# Patient Record
Sex: FEMALE | Race: WHITE | NOT HISPANIC OR LATINO | Employment: UNEMPLOYED | URBAN - NONMETROPOLITAN AREA
[De-identification: names, ages, dates, MRNs, and addresses within clinical notes are randomized per-mention and may not be internally consistent; named-entity substitution may affect disease eponyms.]

---

## 2018-02-20 ENCOUNTER — OFFICE VISIT CONVERTED (OUTPATIENT)
Dept: FAMILY MEDICINE CLINIC | Age: 58
End: 2018-02-20
Attending: FAMILY MEDICINE

## 2018-09-19 ENCOUNTER — CONVERSION ENCOUNTER (OUTPATIENT)
Dept: OTOLARYNGOLOGY | Facility: CLINIC | Age: 58
End: 2018-09-19

## 2018-09-19 ENCOUNTER — OFFICE VISIT CONVERTED (OUTPATIENT)
Dept: OTOLARYNGOLOGY | Facility: CLINIC | Age: 58
End: 2018-09-19
Attending: OTOLARYNGOLOGY

## 2021-04-28 ENCOUNTER — TELEPHONE (OUTPATIENT)
Dept: NEUROSURGERY | Facility: CLINIC | Age: 61
End: 2021-04-28

## 2021-05-16 VITALS — TEMPERATURE: 98.4 F | HEIGHT: 66 IN | WEIGHT: 211.37 LBS | BODY MASS INDEX: 33.97 KG/M2

## 2021-05-18 NOTE — PROGRESS NOTES
Laura Villarreal 1960     Office/Outpatient Visit    Visit Date: Tue, Feb 20, 2018 02:24 pm    Provider: Jerome Matute MD (Assistant: Anca Plummer MA)    Location: [facility]        Electronically signed by Jerome Matute MD on  02/21/2018 09:02:10 AM                             SUBJECTIVE:        CC: oral lesion         HPI:     Sister is in today for evaluation of what may be pemphigoid.  She recently saw Dr. Lopez locally for evaluation of this and was told that she might have pemphigoid.  Sister does not have skin rash or lesions.  However, she was told that her sores were not canker sores.  Rather, the dentist noted these blisters.  She is here for additional evaluation of this.  She denies other acute issue today.     ROS:     CONSTITUTIONAL:  Negative for chills and fever.      CARDIOVASCULAR:  Negative for chest pain and palpitations.      RESPIRATORY:  Negative for recent cough and dyspnea.      GASTROINTESTINAL:  Negative for abdominal pain, nausea and vomiting.          [facility]/:     Last Reviewed on 2/20/2018 02:38 PM by Jerome Matute    Past Medical History:             PAST MEDICAL HISTORY         Positive for    Asthma: Multiple Allergies; ;     Positive for    anxiety/depression;             PAST MEDICAL HISTORY             PREVENTIVE HEALTH MAINTENANCE             COLORECTAL CANCER SCREENING: Up to date (colonoscopy q10y; sigmoidoscopy q5y; Cologuard q3y) was last done 12/2016, Results are in chart; colonoscopy with the following abnormalities noted-- tubulovillous adenoma     MAMMOGRAM: Done within last 2 years and results in are chart was last done 12/19/17 with normal results         Surgical History:         Tonsillectomy; 1965      Hysterectomy: 2005;     right knee  1993;    laser endrometrial 1993;    sinus and vocal cord surg 1998;    vocal cord gat grafting and collagen implntation 1998; Other Surgeries    Right ankle cyst removal 2003;     "\"Rectal Cyst\" removed ;     COLONOSCOPY: was last done  with the following abnormalaties noted-- polyp Talbert         Family History:     Father:  at age 79;  Myocardial Infarction;  Alzheimer's Disease;  Type 2 Diabetes     Mother: Hypothyroidism     Brother(s): Healthy; 3 brother(s) total     Sister(s): 1 sister(s) total;  Skin Cancer     Paternal Grandfather: Alzheimer's Disease         Social History:     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     Screening for breast cancer, unspecified         Immunizations:     Prevnar-13 2016     Fluzone pf-quadrivalent 3 and up 10/17/2016     Fluzone (3 + years dose) 2017     Pneomovax 2006         Allergies:     Last Reviewed on 2018 02:38 PM by Jerome Matute    Penicillins:    Sulfonamides:    Niacin (Nicotinic Acid):    Tetanus Toxoids:        Current Medications:     Last Reviewed on 2018 02:38 PM by Jerome Matute    Pramipexole 0.125mg Tablet Take 1 tablet(s) by mouth qhs     Clonazepam 0.5mg Tablet Take 1 tablet(s) by mouth bid as needed for anxiety     Zoloft 100mg Tablet Take 2 tablet(s) by mouth daily     EpiPen " Auto-Injector 2-Edwin 0.3mg 1:1,000 Solution For Injection use as needed for anaphylaxis     Folic Acid 400mg po qd     Vitamin D 400mg po qd     Diphenhydramine HCl 25mg Capsules 2 capsules prn     Montelukast Sodium 10mg Tablet 1 tab daily     Omeprazole 40mg Capsules, Extended Release 1 capsule daily     OCuSoft Lid Scrub Use one pre-moistened pad daily, use more if needed     OTC Alway Eye Drops Use bid     OTC Refresh Plus Eye drops  Use four times daily         OBJECTIVE:        Vitals:         Current: 2/20/2018 2:27:07 PM    Ht:  5 ft, 6.5 in;  Wt: 204 lbs;  BMI: 32.4    T: 98.2 F (oral);  BP: 133/74 mm Hg (left arm, sitting);  P: 97 bpm (left arm (BP Cuff), sitting);  sCr: 0.74 mg/dL;  GFR: 96.38        Exams:     PHYSICAL EXAM:     GENERAL: vital signs recorded - well developed, well nourished;  no apparent distress;     E/N/T:  normal EACs, TMs, nasal/oral mucosa, teeth, gingiva, and oropharynx;     NECK: range of motion is normal; thyroid is non-palpable;     RESPIRATORY: normal respiratory rate and pattern with no distress; normal breath sounds with no rales, rhonchi, wheezes or rubs;     CARDIOVASCULAR: normal rate; rhythm is regular;  no systolic murmur; no edema;     GASTROINTESTINAL: nontender; normal bowel sounds; no organomegaly;     BREAST/INTEGUMENT: SKIN: no significant rashes or lesions; no suspicious moles;         ASSESSMENT           694.60   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  She has no rash and no sign of joint problems.  I have not previously ordered testing to evaluate for pemphigoid.  For this reason, I'm going to refer her to rheumatology for additional evaluation.  No other near term changes are made..            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

## 2021-06-14 ENCOUNTER — TELEPHONE (OUTPATIENT)
Dept: NEUROSURGERY | Facility: CLINIC | Age: 61
End: 2021-06-14

## 2021-07-01 VITALS
SYSTOLIC BLOOD PRESSURE: 133 MMHG | DIASTOLIC BLOOD PRESSURE: 74 MMHG | BODY MASS INDEX: 32.02 KG/M2 | HEIGHT: 67 IN | TEMPERATURE: 98.2 F | HEART RATE: 97 BPM | WEIGHT: 204 LBS

## 2021-07-15 RX ORDER — MOMETASONE FUROATE AND FORMOTEROL FUMARATE DIHYDRATE 200; 5 UG/1; UG/1
AEROSOL RESPIRATORY (INHALATION)
COMMUNITY

## 2021-07-15 RX ORDER — CLONAZEPAM 0.5 MG/1
TABLET ORAL
COMMUNITY

## 2021-07-15 RX ORDER — ALBUTEROL SULFATE 2.5 MG/3ML
SOLUTION RESPIRATORY (INHALATION)
COMMUNITY

## 2021-07-15 RX ORDER — VALACYCLOVIR HYDROCHLORIDE 500 MG/1
500 TABLET, FILM COATED ORAL 2 TIMES DAILY
COMMUNITY
Start: 2021-06-11

## 2021-07-15 RX ORDER — DULOXETIN HYDROCHLORIDE 20 MG/1
60 CAPSULE, DELAYED RELEASE ORAL DAILY
COMMUNITY

## 2021-07-15 RX ORDER — ASPIRIN 81 MG/1
TABLET ORAL
COMMUNITY

## 2021-07-15 RX ORDER — EPINEPHRINE 0.3 MG/.3ML
INJECTION SUBCUTANEOUS
COMMUNITY

## 2021-07-15 RX ORDER — OMEPRAZOLE 40 MG/1
CAPSULE, DELAYED RELEASE ORAL
COMMUNITY

## 2021-07-15 RX ORDER — TRAMADOL HYDROCHLORIDE 50 MG/1
TABLET ORAL
COMMUNITY

## 2021-07-15 RX ORDER — UREA 10 %
LOTION (ML) TOPICAL
COMMUNITY

## 2021-07-15 RX ORDER — DIPHENHYDRAMINE HCL 25 MG
CAPSULE ORAL
COMMUNITY

## 2021-07-15 RX ORDER — MONTELUKAST SODIUM 10 MG/1
TABLET ORAL
COMMUNITY

## 2021-07-15 RX ORDER — LAMOTRIGINE 25 MG/1
TABLET ORAL
COMMUNITY

## 2021-07-15 RX ORDER — LIDOCAINE 50 MG/G
OINTMENT TOPICAL
COMMUNITY
Start: 2021-06-11

## 2021-07-15 RX ORDER — INDOMETHACIN 50 MG/1
CAPSULE ORAL
COMMUNITY
Start: 2021-06-03

## 2021-07-15 RX ORDER — FLUTICASONE PROPIONATE 50 MCG
SPRAY, SUSPENSION (ML) NASAL
COMMUNITY

## 2021-07-15 RX ORDER — METHOCARBAMOL 500 MG/1
TABLET, FILM COATED ORAL
COMMUNITY

## 2021-07-15 RX ORDER — ASCORBIC ACID 500 MG
TABLET ORAL
COMMUNITY

## 2021-07-20 RX ORDER — EZETIMIBE 10 MG/1
10 TABLET ORAL DAILY
COMMUNITY

## 2021-07-20 RX ORDER — AMLODIPINE BESYLATE 5 MG/1
5 TABLET ORAL DAILY
COMMUNITY

## 2021-07-20 RX ORDER — CARVEDILOL 12.5 MG/1
12.5 TABLET ORAL 2 TIMES DAILY WITH MEALS
COMMUNITY

## 2021-07-22 PROBLEM — M54.50 LOW BACK PAIN: Status: ACTIVE | Noted: 2021-07-22

## 2021-07-22 PROBLEM — J34.89 SINUS DRAINAGE: Status: ACTIVE | Noted: 2021-07-22

## 2021-07-22 PROBLEM — J45.909 ASTHMA: Status: ACTIVE | Noted: 2021-07-22

## 2021-07-22 PROBLEM — E54 VITAMIN C DEFICIENCY: Status: ACTIVE | Noted: 2021-07-22

## 2021-07-22 PROBLEM — F41.9 ANXIETY: Status: ACTIVE | Noted: 2021-07-22

## 2021-07-22 PROBLEM — R49.0 HOARSENESS: Status: ACTIVE | Noted: 2021-07-22

## 2021-07-22 PROBLEM — IMO0002 DEGENERATION OF INTERVERTEBRAL DISC: Status: ACTIVE | Noted: 2021-07-22

## 2021-07-22 PROBLEM — R94.31 EKG ABNORMALITIES: Status: ACTIVE | Noted: 2017-07-05

## 2021-07-22 RX ORDER — TIOTROPIUM BROMIDE INHALATION SPRAY 1.56 UG/1
SPRAY, METERED RESPIRATORY (INHALATION)
COMMUNITY
Start: 2021-04-21

## 2021-07-22 RX ORDER — MIRTAZAPINE 15 MG/1
7.5 TABLET, FILM COATED ORAL NIGHTLY
COMMUNITY

## 2021-07-22 RX ORDER — SERTRALINE HYDROCHLORIDE 100 MG/1
TABLET, FILM COATED ORAL
COMMUNITY

## 2021-07-22 RX ORDER — ALBUTEROL SULFATE 90 UG/1
2 AEROSOL, METERED RESPIRATORY (INHALATION) EVERY 4 HOURS PRN
COMMUNITY

## 2021-07-23 ENCOUNTER — OFFICE VISIT (OUTPATIENT)
Dept: GASTROENTEROLOGY | Facility: CLINIC | Age: 61
End: 2021-07-23

## 2021-07-23 VITALS
HEIGHT: 67 IN | SYSTOLIC BLOOD PRESSURE: 126 MMHG | DIASTOLIC BLOOD PRESSURE: 66 MMHG | HEART RATE: 80 BPM | WEIGHT: 221.2 LBS | BODY MASS INDEX: 34.72 KG/M2 | OXYGEN SATURATION: 96 %

## 2021-07-23 DIAGNOSIS — R11.11 VOMITING WITHOUT NAUSEA, INTRACTABILITY OF VOMITING NOT SPECIFIED, UNSPECIFIED VOMITING TYPE: Primary | ICD-10-CM

## 2021-07-23 PROCEDURE — 99204 OFFICE O/P NEW MOD 45 MIN: CPT | Performed by: NURSE PRACTITIONER

## 2021-07-23 RX ORDER — CETIRIZINE HYDROCHLORIDE 10 MG/1
10 TABLET ORAL DAILY
COMMUNITY

## 2021-07-23 RX ORDER — ARIPIPRAZOLE 5 MG/1
5 TABLET ORAL DAILY
COMMUNITY

## 2021-07-23 NOTE — PROGRESS NOTES
Chief Complaint  Vomiting (2 TO 3 TIMES PER MONTH/NO PATTERN/RANDOM EPISODES ) and Establish Care (NEW PATIENT/[facility] PT )    Laura Villarreal is a 61 y.o. female who presents to [facility] as a new patient.     Result Review :   The following data was reviewed by: Emily Escalante NP on 07/23/2021     History of Present Illness     61 year old female presenting to the office as a new patient with a history of vomiting. Patient reports no consistency in the vomiting and why it is occurring. She reports having an EGD performed over a year ago with  with no findings. Reports vomiting 2-3 times a month. No difference in time of day. Denies any nausea with the vomiting comes on quickly and reports feeling fine before and after. She reports taking omeprazole daily 40 mg with no reflux symptoms. Bowel movements are occurring 1-2 times a day, she takes a stool softener daily to aid with passing of the stool but reports no concerns for constipation.  Patient denies fever, nausea, weight loss, night sweats, melena, hematochezia, hematemesis.    Patient reports EGD over 1 year ago with .  Will retrieve records.  [facility] at [facility].     Labs completed at pcp about 3 weeks ago. Will retrieve records.              Past Medical History:   Diagnosis Date   • Anxiety    • Asthma    • EKG abnormalities 07/05/2017   • H/O degenerative disc disease    • Hoarseness    • Lumbago    • Sinus drainage    • Vitamin C deficiency        Past Surgical History:   Procedure Laterality Date   • ADENOIDECTOMY     • HYSTERECTOMY     • KNEE SURGERY     • SINUS SURGERY     • TONSILLECTOMY           Current Outpatient Medications:   •  albuterol (PROVENTIL) (2.5 MG/3ML) 0.083% nebulizer solution, , Disp: , Rfl:   •  albuterol sulfate  (90 Base) MCG/ACT inhaler, Inhale 2 puffs Every 4 (Four) Hours As Needed., Disp: , Rfl:   •  amLODIPine (NORVASC) 5 MG tablet, Take 5 mg by  mouth Daily., Disp: , Rfl:   •  ARIPiprazole (ABILIFY) 5 MG tablet, Take 5 mg by mouth Daily., Disp: , Rfl:   •  ascorbic acid (VITAMIN C) 500 MG tablet, Vitamin C 500 mg oral tablet take 1 tablet by oral route daily   Active, Disp: , Rfl:   •  carvedilol (COREG) 12.5 MG tablet, Take 12.5 mg by mouth 2 (Two) Times a Day With Meals., Disp: , Rfl:   •  cetirizine (zyrTEC) 10 MG tablet, Take 10 mg by mouth Daily., Disp: , Rfl:   •  diphenhydrAMINE (Benadryl Allergy) 25 mg capsule, Benadryl 25 mg oral capsule take 2 capsules (50 mg) by oral route once daily at bedtime as needed   Active, Disp: , Rfl:   •  docusate sodium (COLACE) 50 MG capsule, Take  by mouth 2 (Two) Times a Day As Needed for Constipation., Disp: , Rfl:   •  DULoxetine (CYMBALTA) 20 MG capsule, Take 60 mg by mouth Daily., Disp: , Rfl:   •  EPINEPHrine (EpiPen 2-Edwin) 0.3 MG/0.3ML solution auto-injector injection, , Disp: , Rfl:   •  ezetimibe (ZETIA) 10 MG tablet, Take 10 mg by mouth Daily., Disp: , Rfl:   •  fluticasone (FLONASE) 50 MCG/ACT nasal spray, fluticasone 50 mcg/actuation nasal spray,suspension spray 2 sprays (100 mcg) in each nostril by intranasal route once daily as needed   Active, Disp: , Rfl:   •  folic acid (FOLVITE) 800 MCG tablet, folic acid 800 mcg oral tablet take 1 tablet (0.8 mg) by oral route once daily   Active, Disp: , Rfl:   •  guaiFENesin (Mucinex) 600 MG 12 hr tablet, Mucinex 600 mg oral tablet extended release 12hr take 2 tablets (1,200 mg) by oral route every 12 hours as needed   Active, Disp: , Rfl:   •  omeprazole (priLOSEC) 40 MG capsule, , Disp: , Rfl:   •  Spiriva Respimat 1.25 MCG/ACT aerosol solution inhaler, , Disp: , Rfl:   •  Umeclidinium Bromide (Incruse Ellipta) 62.5 MCG/INH aerosol powder , Incruse Ellipta 62.5 mcg/actuation inhalation blister with device inhale 1 puff (62.5 mcg) by inhalation route once daily at the same time each day   Active, Disp: , Rfl:   •  aspirin (aspirin) 81 MG EC tablet, aspirin 81 mg  oral tablet,delayed release (DR/EC) take 1 tablet (81 mg) by oral route once daily   Active, Disp: , Rfl:   •  Cholecalciferol 50 MCG (2000 UT) capsule, , Disp: , Rfl:   •  clonazePAM (KlonoPIN) 0.5 MG tablet, , Disp: , Rfl:   •  Cyanocobalamin 1000 MCG/ML liquid, , Disp: , Rfl:   •  indomethacin (INDOCIN) 50 MG capsule, TAKE 1 CAPSULE BY MOUTH 2-3 TIMES DAILY, Disp: , Rfl:   •  lamoTRIgine (LaMICtal) 25 MG tablet, lamotrigine 25 mg oral tablet take 1 tablet by oral route 2 times a day   Active, Disp: , Rfl:   •  lidocaine (XYLOCAINE) 5 % ointment, APPLY TO AFFECTED AREA UP TO FOUR TIMES DAILY AS DIRECTED, Disp: , Rfl:   •  methocarbamol (ROBAXIN) 500 MG tablet, , Disp: , Rfl:   •  mirtazapine (REMERON) 15 MG tablet, Take 7.5 mg by mouth Every Night., Disp: , Rfl:   •  mometasone-formoterol (Dulera) 200-5 MCG/ACT inhaler, Dulera 200-5 mcg/actuation inhalation HFA aerosol inhaler inhale 2 puffs by inhalation route every 12 hours   Active, Disp: , Rfl:   •  montelukast (SINGULAIR) 10 MG tablet, montelukast 10 mg oral tablet take 1 tablet (10 mg) by oral route once daily in the evening   Active, Disp: , Rfl:   •  sertraline (Zoloft) 100 MG tablet, , Disp: , Rfl:   •  traMADol (ULTRAM) 50 MG tablet, tramadol 50 mg oral tablet take 1 tablet (50 mg) by oral route every 6 hours as needed   Active, Disp: , Rfl:   •  valACYclovir (VALTREX) 500 MG tablet, Take 500 mg by mouth 2 (Two) Times a Day., Disp: , Rfl:      Allergies   Allergen Reactions   • Fish-Derived Products Anaphylaxis     PT REPORTS SHE IS HIGHLY ALLERGIC TO ALL SHELLFISH, SEAFOOD INCLUDING TUNA, AND SHRIMP    • Nuts Anaphylaxis     PT CARRIES AN EPI PEN FOR BOTH SEAFOOD AND NUT ALLERGIES    • Niacin Hives   • Penicillins Hives   • Sulfa Antibiotics Hives   • Tetanus Toxoids Hives   • Keflex [Cephalexin] Unknown - Low Severity   • Latex Hives   • Prednisone Hives and Rash       Family History   Problem Relation Age of Onset   • Heart disease Father    • Other  "Father         SPINE PROBLEMS   • Diabetes Father    • Cancer Maternal Grandfather    • Canavan disease Paternal Grandmother    • Heart disease Other         UNCLE        Social History     Social History Narrative   • Not on file       Objective     Vital Signs:   /66 (BP Location: Left arm, Patient Position: Sitting, Cuff Size: Adult)   Pulse 80   Ht 168.9 cm (66.5\")   Wt 100 kg (221 lb 3.2 oz)   SpO2 96%   BMI 35.17 kg/m²     Body mass index is 35.17 kg/m².    Physical Exam  Constitutional:       General: She is not in acute distress.     Appearance: Normal appearance. She is well-developed. She is obese.   Eyes:      Conjunctiva/sclera: Conjunctivae normal.      Pupils: Pupils are equal, round, and reactive to light.      Visual Fields: Right eye visual fields normal and left eye visual fields normal.   Cardiovascular:      Rate and Rhythm: Normal rate and regular rhythm.      Heart sounds: Normal heart sounds.   Pulmonary:      Effort: Pulmonary effort is normal. No retractions.      Breath sounds: Normal breath sounds and air entry.      Comments: Inspection of chest: normal appearance  Abdominal:      General: Bowel sounds are normal.      Palpations: Abdomen is soft.      Tenderness: There is no abdominal tenderness.      Comments: No appreciable hepatosplenomegaly   Musculoskeletal:      Cervical back: Neck supple.      Right lower leg: No edema.      Left lower leg: No edema.   Lymphadenopathy:      Cervical: No cervical adenopathy.   Skin:     Findings: No lesion.      Comments: Turgor normal   Neurological:      Mental Status: She is alert and oriented to person, place, and time.   Psychiatric:         Mood and Affect: Mood and affect normal.                 Assessment and Plan    Diagnoses and all orders for this visit:    1. Vomiting without nausea, intractability of vomiting not specified, unspecified vomiting type (Primary)  -     NM Gastric Emptying; Future    61-year-old female " presenting to the office today as a new patient with a history of vomiting without nausea.  Patient has had an EGD in the past year with Dr. Pacheco I will retrieve records.  Patient had lab work performed with primary care provider I will retrieve records.  I have set the patient up for a gastric emptying study.  We did discuss the need for a repeat EGD.  Patient would like to start with a gastric emptying study and then consider EGD.  I have educated the patient to eat 5 small meals throughout the day.  Patient will keep a food diary to ensure that this is not related to a particular food.  We will follow up in the office in 3 months.  Patient to call the office with any questions or concerns.            Follow Up   Return in about 3 months (around 10/23/2021).  Patient was given instructions and counseling regarding her condition or for health maintenance advice. Please see specific information pulled into the AVS if appropriate.

## 2021-07-23 NOTE — PATIENT INSTRUCTIONS
Vomiting, Adult  Vomiting occurs when stomach contents are thrown up and out of the mouth. Many people notice nausea before vomiting. Vomiting can make you feel weak and cause you to become dehydrated. Dehydration can make you feel tired and thirsty, cause you to have a dry mouth, and decrease how often you urinate. Older adults and people who have other diseases or a weak body defense system (immune system) are at higher risk for dehydration. It is important to treat vomiting as told by your health care provider.  Follow these instructions at home:    Eating and drinking         Follow these recommendations as told by your health care provider:  · Take an oral rehydration solution (ORS). This is a drink that is sold at pharmacies and retail stores.  · Eat bland, easy-to-digest foods in small amounts as you are able. These foods include bananas, applesauce, rice, lean meats, toast, and crackers.  · Drink clear fluids slowly and in small amounts as you are able. Clear fluids include water, ice chips, low-calorie sports drinks, and fruit juice that has water added (diluted fruit juice).  · Avoid drinking fluids that contain a lot of sugar or caffeine, such as energy drinks, sports drinks, and soda.  · Avoid alcohol.  · Avoid spicy or fatty foods.    General instructions  · Wash your hands often using soap and water. If soap and water are not available, use hand . Make sure that everyone in your household washes their hands frequently.  · Take over-the-counter and prescription medicines only as told by your health care provider.  · Rest at home while you recover.  · Watch your condition for any changes.  · Keep all follow-up visits as told by your health care provider. This is important.  Contact a health care provider if:  · Your vomiting gets worse.  · You have new symptoms.  · You have a fever.  · You cannot drink fluids without vomiting.  · You feel light-headed or dizzy.  · You have a headache.  · You  have muscle cramps.  · You have a rash.  · You have pain while urinating.  Get help right away if:  · You have pain in your chest, neck, arm, or jaw.  · You feel extremely weak or you faint.  · You have persistent vomiting.  · You have vomit that is bright red or looks like black coffee grounds.  · You have stools that are bloody or black, or stools that look like tar.  · You have a severe headache, a stiff neck, or both.  · You have severe pain, cramping, or bloating in your abdomen.  · You have trouble breathing or you are breathing very quickly.  · Your heart is beating very quickly.  · Your skin feels cold and clammy.  · You feel confused.  · You have signs of dehydration, such as:  ? Dark urine, very little urine, or no urine.  ? Cracked lips.  ? Dry mouth.  ? Sunken eyes.  ? Sleepiness.  ? Weakness.  These symptoms may represent a serious problem that is an emergency. Do not wait to see if the symptoms will go away. Get medical help right away. Call your local emergency services (911 in the [place] Do not drive yourself to the hospital.  Summary  · Vomiting occurs when stomach contents are thrown up and out of the mouth. Vomiting can cause you to become dehydrated. Older adults and people who have other diseases or a weak immune system are at higher risk for dehydration.  · It is important to treat vomiting as told by your health care provider. Follow your health care provider's instructions about eating and drinking.  · Wash your hands often using soap and water. If soap and water are not available, use hand . Make sure that everyone in your household washes their hands frequently.  · Watch your condition for any changes and for signs of dehydration.  · Keep all follow-up visits as told by your health care provider. This is important.  This information is not intended to replace advice given to you by your health care provider. Make sure you discuss any questions you have with your health care  provider.  Document Revised: 06/05/2020 Document Reviewed: 05/28/2019  [employer] Patient Education © 2021 [employer]

## 2021-07-29 DIAGNOSIS — R11.11 VOMITING WITHOUT NAUSEA, INTRACTABILITY OF VOMITING NOT SPECIFIED, UNSPECIFIED VOMITING TYPE: Primary | ICD-10-CM

## 2021-08-11 NOTE — PROGRESS NOTES
Subjective   Patient ID: Laura Villarreal is a 61 y.o. female is being seen for consultation today at the request of Mellisa Dow MD for lumbar radiculopathy.  Notes from today's visit will be forwarded to Dr. Dow upon completion. MRI lumbar done on 3/31/21.    History of Present Illness     Sr. Sanchez is being seen today for neurosurgical opinion regarding low back and L lateral hip, anterolateral thigh, shin and top of foot pain. The back pain started back in March. By June/early July she was having severe pain in the L leg as described above. The pain was described as severe cramping pain that progressed to a sensation of hot lava being poured over the L leg. She was prescribed a medrol dose pack and has noticed resolution in the cramping pain. She still has the burning symptoms in the left leg but it occurs intermittently at different levels of severity. She denies any bowel or bladder incontinence. She uses a cane to mobilize but notes no severe leg weakness.      The following portions of the patient's history were reviewed and updated as appropriate: allergies, current medications, past family history, past medical history, past social history, past surgical history and problem list.    Review of Systems   Constitutional: Negative for chills and fever.   HENT: Negative for ear pain and tinnitus.    Eyes: Negative for pain and visual disturbance.   Respiratory: Negative for cough and shortness of breath.    Cardiovascular: Negative for chest pain and palpitations.   Gastrointestinal: Negative for abdominal pain and nausea.        No b/b incontinence   Genitourinary: Negative for difficulty urinating and enuresis.   Musculoskeletal: Positive for back pain (radiates to L leg) and gait problem.   Skin: Negative for rash.   Neurological: Positive for numbness ( L Leg tinglng/burning). Negative for weakness.   Psychiatric/Behavioral: Positive for sleep disturbance (L leg).       Objective      Physical  Exam  Vitals reviewed.   Constitutional:       General: She is not in acute distress.     Appearance: Normal appearance. She is well-developed. She is obese. She is not ill-appearing or diaphoretic.   HENT:      Head: Normocephalic and atraumatic.   Eyes:      General:         Right eye: No discharge.         Left eye: No discharge.      Conjunctiva/sclera: Conjunctivae normal.   Neck:      Trachea: No tracheal deviation.   Cardiovascular:      Rate and Rhythm: Normal rate.      Comments: NO calf tenderness, warmth or discoloration to bilateral palpation.   Pulmonary:      Effort: Pulmonary effort is normal. No respiratory distress.   Abdominal:      General: There is no distension.      Palpations: Abdomen is soft.      Tenderness: There is no abdominal tenderness.   Musculoskeletal:         General: No tenderness. Normal range of motion.      Cervical back: Normal range of motion and neck supple.      Right lower leg: Edema (mild) present.      Left lower leg: Edema (mild ) present.   Skin:     General: Skin is warm and dry.      Findings: No erythema.   Neurological:      Mental Status: She is alert and oriented to person, place, and time.      GCS: GCS eye subscore is 4. GCS verbal subscore is 5. GCS motor subscore is 6.      Sensory: No sensory deficit.      Motor: No abnormal muscle tone.      Coordination: Coordination normal.      Deep Tendon Reflexes: Reflexes are normal and symmetric. Reflexes normal.      Comments: No motor or sensory deficits. DTR's normal. Negative Ramirez's; negative clonus. SLR positive on the left at 30 degrees. Able to bear weight on heels and toes bilaterally.     Psychiatric:         Behavior: Behavior is cooperative.         Thought Content: Thought content normal.        Assessment/Plan   Independent Review of Radiographic Studies:      I have independently viewed MRI images of lumbar spine from [facility] from March of this year. The MRI shows multi-level  degenerative changes on both sides of the lumbar spine. Foraminal narrowing noted on the left at L3/4 and bilaterally at L4/5.    Medical Decision Making:      Sr. Sanchez notes that she is doing better in terms of her overall pain but would just like to have more relief in the left leg pain. There is no weakness on exam so I think that conservative management can be tried. Sr. Sanchez will try a lumbar MARVIN and call the office if her symptoms do not improve or she develops leg weakness or B/B incontinence.     Diagnoses and all orders for this visit:    1. Spinal stenosis, lumbar region, without neurogenic claudication (Primary)  -     Epidural Block    2. Left lumbar radiculitis  -     Epidural Block      Return if symptoms worsen or fail to improve.

## 2021-08-23 ENCOUNTER — TELEPHONE (OUTPATIENT)
Dept: NEUROSURGERY | Facility: CLINIC | Age: 61
End: 2021-08-23

## 2021-08-23 NOTE — TELEPHONE ENCOUNTER
Caller: Laura Villarreal    Relationship: Self    Best call back number:[phone]    What is the best time to reach you:ANYTIME    Who are you requesting to speak with (clinical staff, provider,  specific staff member): CLINICAL STAFF    Do you know the name of the person who called: NA    What was the call regarding:PT CALLED AND STATES THAT SHE HAD MISSED A CALL FROM OUR OFFICE STATING SHE NEEDED TO CALL BACK-DID NOT SEE AN ENCOUNTER IN THE PTS CHART-PLEASE ADVISE THANK YOU    Do you require a callback:YES

## 2021-08-26 ENCOUNTER — OFFICE VISIT (OUTPATIENT)
Dept: NEUROSURGERY | Facility: CLINIC | Age: 61
End: 2021-08-26

## 2021-08-26 VITALS
BODY MASS INDEX: 35.03 KG/M2 | TEMPERATURE: 97.3 F | SYSTOLIC BLOOD PRESSURE: 142 MMHG | RESPIRATION RATE: 20 BRPM | DIASTOLIC BLOOD PRESSURE: 80 MMHG | HEART RATE: 84 BPM | HEIGHT: 66 IN | WEIGHT: 218 LBS

## 2021-08-26 DIAGNOSIS — M54.16 LEFT LUMBAR RADICULITIS: ICD-10-CM

## 2021-08-26 DIAGNOSIS — M48.061 SPINAL STENOSIS, LUMBAR REGION, WITHOUT NEUROGENIC CLAUDICATION: Primary | ICD-10-CM

## 2021-08-26 PROCEDURE — 99213 OFFICE O/P EST LOW 20 MIN: CPT | Performed by: NURSE PRACTITIONER

## 2021-08-26 RX ORDER — FLUTICASONE FUROATE 200 UG/1
POWDER RESPIRATORY (INHALATION)
COMMUNITY
Start: 2021-06-24

## 2021-08-27 ENCOUNTER — TELEPHONE (OUTPATIENT)
Dept: NEUROSURGERY | Facility: CLINIC | Age: 61
End: 2021-08-27

## 2021-08-27 NOTE — TELEPHONE ENCOUNTER
I called and left message on [facility] office voicemail to call the office. (would like to refer a patient -- do they have a referral form & what is the fax number).

## 2021-09-15 ENCOUNTER — TELEPHONE (OUTPATIENT)
Dept: GASTROENTEROLOGY | Facility: CLINIC | Age: 61
End: 2021-09-15

## 2021-09-15 NOTE — TELEPHONE ENCOUNTER
"I spoke with patient in regards to esophagram and gastric emptying study. Patient wants to defer at this time due to having a HIDA scan at the end of the month. Patient states she can \"only handle one thing at a time,\" and she \"wasn't aware of all these tests being ordered.\" Patient deferred testing at this time.   "

## 2024-03-13 ENCOUNTER — TELEMEDICINE (OUTPATIENT)
Dept: FAMILY MEDICINE CLINIC | Age: 64
End: 2024-03-13
Payer: COMMERCIAL

## 2024-03-13 DIAGNOSIS — L20.84 INTRINSIC ECZEMA: ICD-10-CM

## 2024-03-13 DIAGNOSIS — J30.1 NON-SEASONAL ALLERGIC RHINITIS DUE TO POLLEN: ICD-10-CM

## 2024-03-13 DIAGNOSIS — J01.00 SUBACUTE MAXILLARY SINUSITIS: Primary | ICD-10-CM

## 2024-03-13 DIAGNOSIS — J45.21 MILD INTERMITTENT ASTHMA WITH EXACERBATION: ICD-10-CM

## 2024-03-13 DIAGNOSIS — J40 BRONCHITIS: ICD-10-CM

## 2024-03-13 PROCEDURE — 99203 OFFICE O/P NEW LOW 30 MIN: CPT | Performed by: FAMILY MEDICINE

## 2024-03-13 RX ORDER — AZITHROMYCIN 250 MG/1
TABLET, FILM COATED ORAL
Qty: 6 TABLET | Refills: 0 | Status: SHIPPED | OUTPATIENT
Start: 2024-03-13

## 2024-03-13 RX ORDER — BENZOCAINE/MENTHOL 6 MG-10 MG
1 LOZENGE MUCOUS MEMBRANE 2 TIMES DAILY
Qty: 14 G | Refills: 0 | Status: SHIPPED | OUTPATIENT
Start: 2024-03-13

## 2024-03-13 RX ORDER — DEXTROMETHORPHAN HYDROBROMIDE AND PROMETHAZINE HYDROCHLORIDE 15; 6.25 MG/5ML; MG/5ML
5 SYRUP ORAL 4 TIMES DAILY PRN
Qty: 180 ML | Refills: 0 | Status: SHIPPED | OUTPATIENT
Start: 2024-03-13

## 2024-03-13 RX ORDER — FLUTICASONE PROPIONATE 50 MCG
2 SPRAY, SUSPENSION (ML) NASAL DAILY
Qty: 15.8 ML | Refills: 2 | Status: SHIPPED | OUTPATIENT
Start: 2024-03-13

## 2024-03-13 NOTE — PROGRESS NOTES
Laura Villarreal presents to [facility]. Patient is being seen, via telehealth, and pt is not in the office-she is at [place].  Mychart or doximetry was used for this visit. Laura and I were able to hear each other simultaneously in real time. I introduced myself and verified Laura identity. I explained how the telemedicine visit will occur.  Laura verbally gave consent for the use of telemedicine in her care. Laura is also aware that this visit will be charged as a regular OV which may require a copay      Chief Complaint: SOA, sinus congestion    Subjective     History of Present Illness:  HPI  Laura has been sick for over a month, she has fatigue, SOA, sinus congestion.  She is on anuity and spiriva for her asthma.  She is on allergy shots she is also currently on cetirizine 10 mg daily.  she has productive green sputum cough for past 2 weeks, it is getting worse, cough is spasmic.  No fever/N/V/diarrhea    Her GERD is worse since she has been sick.  She has chronic GERD and is on omeprazole 40 mg daily    She has underlying history of hypertension is currently on amlodipine, carvedilol    Her depression is well-controlled with Abilify 5 mg daily and Cymbalta 60 mg daily she presents with hypercholesterolemia.  Current treatment is low-cholesterol diet and rosuvastatin 10 mg nightly.  She tolerates medication well                Result Review   The following data was reviewed by Corinne Randolph MD on 03/13/2024.             Assessment and Plan:   Diagnoses and all orders for this visit:    1. Subacute maxillary sinusitis (Primary)  Comments:  Will treat with azithromycin, Flonase NS and I will call in Promethazine DM for her bronchitis.  She is to follow-up if no improvement or worsening sxs.  Orders:  -     fluticasone (FLONASE) 50 MCG/ACT nasal spray; 2 sprays into the nostril(s) as directed by provider Daily.  Dispense: 15.8 mL; Refill: 2  -      promethazine-dextromethorphan (PROMETHAZINE-DM) 6.25-15 MG/5ML syrup; Take 5 mL by mouth 4 (Four) Times a Day As Needed for Cough.  Dispense: 180 mL; Refill: 0  -     azithromycin (ZITHROMAX) 250 MG tablet; Take 2 tablets PO the first day, then 1 tablet PO daily for 4 days.  Dispense: 6 tablet; Refill: 0    2. Intrinsic eczema  Comments:  In her ears bilaterally.  Will start her on triamcinolone topical steroid cream to treat twice daily x 10 days  Orders:  -     hydrocortisone 1 % cream; Apply 1 Application topically to the appropriate area as directed 2 (Two) Times a Day.  Dispense: 14 g; Refill: 0    3. Bronchitis  Comments:  Will treat with azithromycin and Promethazine DM.  She can continue her inhalers annuity and Dulera as well as as needed albuterol  Orders:  -     fluticasone (FLONASE) 50 MCG/ACT nasal spray; 2 sprays into the nostril(s) as directed by provider Daily.  Dispense: 15.8 mL; Refill: 2  -     promethazine-dextromethorphan (PROMETHAZINE-DM) 6.25-15 MG/5ML syrup; Take 5 mL by mouth 4 (Four) Times a Day As Needed for Cough.  Dispense: 180 mL; Refill: 0  -     azithromycin (ZITHROMAX) 250 MG tablet; Take 2 tablets PO the first day, then 1 tablet PO daily for 4 days.  Dispense: 6 tablet; Refill: 0    4. Mild intermittent asthma with exacerbation  Comments:  Will treat with azithromycin and Promethazine DM. She can continue her inhalers annuity and Dulera as well as as needed albuterol    5. Non-seasonal allergic rhinitis due to pollen  Comments:  Continue Zyrtec and start Flonase nasal spray              Objective     Medications:  Current Outpatient Medications   Medication Instructions    albuterol (PROVENTIL) (2.5 MG/3ML) 0.083% nebulizer solution No dose, route, or frequency recorded.    albuterol sulfate  (90 Base) MCG/ACT inhaler 2 puffs, Inhalation, Every 4 Hours PRN    amLODIPine (NORVASC) 5 mg, Oral, Daily    ARIPiprazole (ABILIFY) 5 mg, Oral, Daily    Arnuity Ellipta 200 MCG/ACT  aerosol powder  No dose, route, or frequency recorded.    ascorbic acid (VITAMIN C) 500 MG tablet Vitamin C 500 mg oral tablet take 1 tablet by oral route daily   Active    aspirin (aspirin) 81 MG EC tablet aspirin 81 mg oral tablet,delayed release (DR/EC) take 1 tablet (81 mg) by oral route once daily   Active    azithromycin (ZITHROMAX) 250 MG tablet Take 2 tablets PO the first day, then 1 tablet PO daily for 4 days.    carvedilol (COREG) 12.5 mg, Oral, 2 Times Daily With Meals    cetirizine (ZYRTEC) 10 mg, Oral, Daily    Cholecalciferol 50 MCG (2000 UT) capsule No dose, route, or frequency recorded.    clonazePAM (KlonoPIN) 0.5 MG tablet No dose, route, or frequency recorded.    Cyanocobalamin 1000 MCG/ML liquid No dose, route, or frequency recorded.    diphenhydrAMINE (Benadryl Allergy) 25 mg capsule Benadryl 25 mg oral capsule take 2 capsules (50 mg) by oral route once daily at bedtime as needed   Active    docusate sodium (COLACE) 50 MG capsule Oral, 2 Times Daily PRN    DULoxetine (CYMBALTA) 60 mg, Oral, Daily    EPINEPHrine (EpiPen 2-Edwin) 0.3 MG/0.3ML solution auto-injector injection No dose, route, or frequency recorded.    ezetimibe (ZETIA) 10 mg, Oral, Daily    fluticasone (FLONASE) 50 MCG/ACT nasal spray 2 sprays, Nasal, Daily    folic acid (FOLVITE) 800 MCG tablet folic acid 800 mcg oral tablet take 1 tablet (0.8 mg) by oral route once daily   Active    guaiFENesin (Mucinex) 600 MG 12 hr tablet Mucinex 600 mg oral tablet extended release 12hr take 2 tablets (1,200 mg) by oral route every 12 hours as needed   Active    hydrocortisone 1 % cream 1 Application, Topical, 2 Times Daily    indomethacin (INDOCIN) 50 MG capsule TAKE 1 CAPSULE BY MOUTH 2-3 TIMES DAILY    lamoTRIgine (LaMICtal) 25 MG tablet lamotrigine 25 mg oral tablet take 1 tablet by oral route 2 times a day   Active    lidocaine (XYLOCAINE) 5 % ointment APPLY TO AFFECTED AREA UP TO FOUR TIMES DAILY AS DIRECTED    methocarbamol (ROBAXIN) 500 MG  tablet No dose, route, or frequency recorded.    mirtazapine (REMERON) 7.5 mg, Oral, Nightly    mometasone-formoterol (Dulera) 200-5 MCG/ACT inhaler Dulera 200-5 mcg/actuation inhalation HFA aerosol inhaler inhale 2 puffs by inhalation route every 12 hours   Active    montelukast (SINGULAIR) 10 MG tablet montelukast 10 mg oral tablet take 1 tablet (10 mg) by oral route once daily in the evening   Active    omeprazole (priLOSEC) 40 MG capsule No dose, route, or frequency recorded.    promethazine-dextromethorphan (PROMETHAZINE-DM) 6.25-15 MG/5ML syrup 5 mL, Oral, 4 Times Daily PRN    sertraline (Zoloft) 100 MG tablet No dose, route, or frequency recorded.    Spiriva Respimat 1.25 MCG/ACT aerosol solution inhaler No dose, route, or frequency recorded.    traMADol (ULTRAM) 50 MG tablet tramadol 50 mg oral tablet take 1 tablet (50 mg) by oral route every 6 hours as needed   Active    Umeclidinium Bromide (Incruse Ellipta) 62.5 MCG/INH aerosol powder  Incruse Ellipta 62.5 mcg/actuation inhalation blister with device inhale 1 puff (62.5 mcg) by inhalation route once daily at the same time each day   Active    valACYclovir (VALTREX) 500 mg, Oral, 2 Times Daily        Vital Signs:   There were no vitals taken for this visit.    BMI cannot be calculated due to outdated height or weight values.  Please input a current height/weight in Vitals and re-renter BMIFOLLOWUP in Note to pull in correct documentation based on BMI range.       Physical Exam:  Physical Exam  Vitals and nursing note reviewed.   Constitutional:       General: She is not in acute distress.     Appearance: Normal appearance. She is not ill-appearing, toxic-appearing or diaphoretic.   HENT:      Head: Normocephalic and atraumatic.      Right Ear: Tympanic membrane, ear canal and external ear normal.      Left Ear: Tympanic membrane, ear canal and external ear normal.      Nose: No congestion or rhinorrhea.      Mouth/Throat:      Mouth: Mucous membranes are  moist.      Pharynx: Oropharynx is clear. No oropharyngeal exudate or posterior oropharyngeal erythema.   Eyes:      Extraocular Movements: Extraocular movements intact.      Conjunctiva/sclera: Conjunctivae normal.      Pupils: Pupils are equal, round, and reactive to light.   Cardiovascular:      Rate and Rhythm: Normal rate and regular rhythm.      Heart sounds: Normal heart sounds.   Pulmonary:      Effort: Pulmonary effort is normal.      Breath sounds: Normal breath sounds. No wheezing, rhonchi or rales.   Abdominal:      General: Abdomen is flat.      Palpations: Abdomen is soft. There is no mass.      Tenderness: There is no abdominal tenderness.      Hernia: No hernia is present.   Musculoskeletal:      Cervical back: Neck supple. No rigidity.      Right lower leg: No edema.      Left lower leg: No edema.   Lymphadenopathy:      Cervical: No cervical adenopathy.   Skin:     General: Skin is warm and dry.      Comments: Erythema and eczematous reaction outer ear bilaterally   Neurological:      General: No focal deficit present.      Mental Status: She is alert and oriented to person, place, and time. Mental status is at baseline.   Psychiatric:         Mood and Affect: Mood normal.         Behavior: Behavior normal.         Thought Content: Thought content normal.         Judgment: Judgment normal.           Review of Systems:  Review of Systems   Constitutional:  Positive for fatigue. Negative for chills and fever.   HENT:  Positive for congestion, ear pain, rhinorrhea and sinus pressure. Negative for sore throat.    Eyes:  Negative for blurred vision and double vision.   Respiratory:  Positive for cough and shortness of breath. Negative for wheezing.    Cardiovascular:  Negative for chest pain and palpitations.   Gastrointestinal:  Negative for abdominal pain, blood in stool, constipation, diarrhea, nausea and vomiting.   Skin:  Positive for rash.   Neurological:  Negative for dizziness and headache.    Psychiatric/Behavioral:  Negative for sleep disturbance, suicidal ideas and depressed mood. The patient is not nervous/anxious.               Follow Up   No follow-ups on file.    Part of this note may be an electronic transcription/translation of spoken language to printed   text using the Dragon Dictation System.            Medical History:  Medications Discontinued During This Encounter   Medication Reason    fluticasone (FLONASE) 50 MCG/ACT nasal spray Reorder      Past Medical History:    Anxiety    Asthma    EKG abnormalities    H/O degenerative disc disease    Hoarseness    Lumbago    Sinus drainage    Vitamin C deficiency     Past Surgical History:    ADENOIDECTOMY    HYSTERECTOMY    KNEE SURGERY    SINUS SURGERY    TONSILLECTOMY      Family History   Problem Relation Age of Onset    Heart disease Father     Other Father         SPINE PROBLEMS    Diabetes Father     Cancer Maternal Grandfather     Canavan disease Paternal Grandmother     Heart disease Other         UNCLE     Social History     Tobacco Use    Smoking status: Never    Smokeless tobacco: Never   Substance Use Topics    Alcohol use: Never       Health Maintenance Due   Topic Date Due    COLORECTAL CANCER SCREENING  Never done    Pneumococcal Vaccine 0-64 (1 of 2 - PCV) Never done    RSV Vaccine - Adults (1 - 1-dose 60+ series) Never done    HEPATITIS C SCREENING  Never done    ANNUAL PHYSICAL  Never done        Immunization History   Administered Date(s) Administered    COVID-19 (PFIZER) BIVALENT 12+YRS 10/12/2022    COVID-19 (PFIZER) Purple Cap Monovalent 01/22/2021, 02/12/2021, 10/31/2021, 04/26/2022    COVID-19 F23 (PFIZER) 12YRS+ (COMIRNATY) 10/25/2023       Allergies   Allergen Reactions    Fish-Derived Products Anaphylaxis     PT REPORTS SHE IS HIGHLY ALLERGIC TO ALL SHELLFISH, SEAFOOD INCLUDING TUNA, AND SHRIMP     Nuts Anaphylaxis     PT CARRIES AN EPI PEN FOR BOTH SEAFOOD AND NUT ALLERGIES     Niacin Hives    Penicillins Hives     Sulfa Antibiotics Hives    Tetanus Toxoids Hives    Keflex [Cephalexin] Unknown - Low Severity    Latex Hives    Prednisone Hives and Rash

## 2024-06-10 ENCOUNTER — TELEPHONE (OUTPATIENT)
Dept: FAMILY MEDICINE CLINIC | Age: 64
End: 2024-06-10
Payer: COMMERCIAL

## 2024-06-10 RX ORDER — DULOXETIN HYDROCHLORIDE 20 MG/1
60 CAPSULE, DELAYED RELEASE ORAL DAILY
Qty: 270 CAPSULE | Refills: 1 | Status: CANCELLED | OUTPATIENT
Start: 2024-06-10

## 2024-06-10 RX ORDER — CIMETIDINE 300 MG/1
TABLET, FILM COATED ORAL
COMMUNITY
End: 2024-06-10

## 2024-06-10 RX ORDER — AMLODIPINE BESYLATE 5 MG/1
5 TABLET ORAL DAILY
Qty: 90 TABLET | Refills: 1 | Status: SHIPPED | OUTPATIENT
Start: 2024-06-10

## 2024-06-10 RX ORDER — DIAZEPAM 5 MG/1
5 TABLET ORAL
COMMUNITY
Start: 2024-02-05 | End: 2024-06-10

## 2024-06-10 RX ORDER — TRIAMCINOLONE ACETONIDE 1 MG/G
OINTMENT TOPICAL
COMMUNITY
End: 2024-06-10

## 2024-06-10 RX ORDER — SERTRALINE HYDROCHLORIDE 100 MG/1
100 TABLET, FILM COATED ORAL DAILY
Qty: 90 TABLET | Refills: 1 | Status: CANCELLED | OUTPATIENT
Start: 2024-06-10

## 2024-06-10 RX ORDER — IBUPROFEN 800 MG/1
1 TABLET ORAL 3 TIMES DAILY
COMMUNITY

## 2024-06-10 RX ORDER — ROSUVASTATIN CALCIUM 10 MG/1
10 TABLET, COATED ORAL DAILY
COMMUNITY
Start: 2024-01-31 | End: 2024-06-10 | Stop reason: SDUPTHER

## 2024-06-10 RX ORDER — CARVEDILOL 12.5 MG/1
12.5 TABLET ORAL 2 TIMES DAILY WITH MEALS
Qty: 180 TABLET | Refills: 1 | Status: SHIPPED | OUTPATIENT
Start: 2024-06-10

## 2024-06-10 RX ORDER — ARIPIPRAZOLE 5 MG/1
5 TABLET ORAL DAILY
Qty: 90 TABLET | Refills: 1 | Status: SHIPPED | OUTPATIENT
Start: 2024-06-10

## 2024-06-10 RX ORDER — ROSUVASTATIN CALCIUM 10 MG/1
10 TABLET, COATED ORAL DAILY
Qty: 90 TABLET | Refills: 1 | Status: SHIPPED | OUTPATIENT
Start: 2024-06-10 | End: 2025-06-10

## 2024-06-10 RX ORDER — CETIRIZINE HYDROCHLORIDE 10 MG/1
10 TABLET ORAL DAILY
Qty: 90 TABLET | Refills: 1 | Status: SHIPPED | OUTPATIENT
Start: 2024-06-10

## 2024-06-10 RX ORDER — DULOXETIN HYDROCHLORIDE 60 MG/1
60 CAPSULE, DELAYED RELEASE ORAL DAILY
Qty: 90 CAPSULE | Refills: 1 | Status: SHIPPED | OUTPATIENT
Start: 2024-06-10

## 2024-06-10 RX ORDER — OMEPRAZOLE 40 MG/1
40 CAPSULE, DELAYED RELEASE ORAL DAILY
Qty: 90 CAPSULE | Refills: 1 | Status: SHIPPED | OUTPATIENT
Start: 2024-06-10

## 2024-06-10 RX ORDER — HYDROCODONE BITARTRATE AND ACETAMINOPHEN 5; 325 MG/1; MG/1
TABLET ORAL
COMMUNITY
End: 2024-06-10

## 2024-06-26 ENCOUNTER — NURSING HOME (OUTPATIENT)
Dept: FAMILY MEDICINE CLINIC | Age: 64
End: 2024-06-26
Payer: COMMERCIAL

## 2024-06-26 VITALS
WEIGHT: 229 LBS | BODY MASS INDEX: 36.96 KG/M2 | DIASTOLIC BLOOD PRESSURE: 83 MMHG | RESPIRATION RATE: 18 BRPM | SYSTOLIC BLOOD PRESSURE: 125 MMHG | HEART RATE: 96 BPM | TEMPERATURE: 98.1 F

## 2024-06-26 DIAGNOSIS — J45.20 MILD INTERMITTENT ASTHMA WITHOUT COMPLICATION: ICD-10-CM

## 2024-06-26 DIAGNOSIS — Z12.11 ENCOUNTER FOR SCREENING COLONOSCOPY: ICD-10-CM

## 2024-06-26 DIAGNOSIS — Z11.59 ENCOUNTER FOR SCREENING FOR OTHER VIRAL DISEASES: ICD-10-CM

## 2024-06-26 DIAGNOSIS — T14.8XXA BRUISING: ICD-10-CM

## 2024-06-26 DIAGNOSIS — M17.0 PRIMARY OSTEOARTHRITIS OF BOTH KNEES: ICD-10-CM

## 2024-06-26 DIAGNOSIS — J30.1 NON-SEASONAL ALLERGIC RHINITIS DUE TO POLLEN: ICD-10-CM

## 2024-06-26 DIAGNOSIS — Z23 ENCOUNTER FOR IMMUNIZATION: ICD-10-CM

## 2024-06-26 DIAGNOSIS — L20.84 INTRINSIC ECZEMA: ICD-10-CM

## 2024-06-26 DIAGNOSIS — Z78.0 POSTMENOPAUSAL STATE: ICD-10-CM

## 2024-06-26 DIAGNOSIS — Z12.31 SCREENING MAMMOGRAM FOR BREAST CANCER: ICD-10-CM

## 2024-06-26 DIAGNOSIS — Z00.00 ENCOUNTER FOR ANNUAL WELLNESS EXAM IN MEDICARE PATIENT: Primary | ICD-10-CM

## 2024-06-26 RX ORDER — NYSTATIN AND TRIAMCINOLONE ACETONIDE 100000; 1 [USP'U]/G; MG/G
1 OINTMENT TOPICAL 2 TIMES DAILY
Qty: 30 G | Refills: 0 | Status: SHIPPED | OUTPATIENT
Start: 2024-06-26 | End: 2024-06-27 | Stop reason: SDUPTHER

## 2024-06-26 NOTE — PROGRESS NOTES
The ABCs of the Annual Wellness Visit  Subsequent Medicare Wellness Visit    Subjective    Laura Villarreal is a 63 y.o. female who presents for a Subsequent Medicare Wellness Visit.    The following portions of the patient's history were reviewed and   updated as appropriate: allergies, current medications, past family history, past medical history, past social history, past surgical history, and problem list.    Compared to one year ago, the patient feels her physical   health is the same.    Compared to one year ago, the patient feels her mental   health is better.    Recent Hospitalizations:  She was not admitted to the hospital during the last year.       Advance Care Planning  Advance Directive is not on file.  ACP discussion was held with the patient during this visit. Patient has an advance directive (not in EMR), copy requested.    Does the patient have evidence of cognitive impairment? No    Aspirin is on active medication list. Aspirin use is indicated based on review of current medical condition/s. Pros and cons of this therapy have been discussed today. Benefits of this medication outweigh potential harm.  Patient has been encouraged to continue taking this medication.  .      Patient Active Problem List   Diagnosis    Anxiety    Asthma    Degeneration of intervertebral disc    EKG abnormalities    Hoarseness    Low back pain    Sinus drainage    Vitamin C deficiency    Non-seasonal allergic rhinitis due to pollen       Current Medical Providers:  Patient Care Team:  Corinne Randolph MD as PCP - General (Family Medicine)    No opioid medication identified on active medication list. I have reviewed chart for other potential  high risk medication/s and harmful drug interactions in the elderly.             Objective    Vitals:    06/26/24 1141   BP: 125/83   Pulse: 96   Resp: 18   Temp: 98.1 °F (36.7 °C)   Weight: 104 kg (229 lb)     Estimated body mass index is 36.96 kg/m² as calculated from the  "following:    Height as of 21: 167.6 cm (66\").    Weight as of this encounter: 104 kg (229 lb).    BMI cannot be calculated due to outdated height or weight values.  Please input a current height/weight in Vitals and re-renter BMIFOLLOWUP in Note to pull in correct documentation based on BMI range.                HEALTH RISK ASSESSMENT    Smoking Status:  Social History     Tobacco Use   Smoking Status Never   Smokeless Tobacco Never     Alcohol Consumption:  Social History     Substance and Sexual Activity   Alcohol Use Never     Fall Risk Screen:    STEADI Fall Risk Assessment was completed, and patient is at LOW risk for falls.Assessment completed on:2024    Depression Screenin/26/2024    11:00 AM   PHQ-2/PHQ-9 Depression Screening   Little Interest or Pleasure in Doing Things 0-->not at all   Feeling Down, Depressed or Hopeless 0-->not at all   PHQ-9: Brief Depression Severity Measure Score 0       Health Habits and Functional and Cognitive Screenin/26/2024    11:00 AM   Functional & Cognitive Status   Do you have difficulty preparing food and eating? No   Do you have difficulty bathing yourself, getting dressed or grooming yourself? No   Do you have difficulty using the toilet? No   Do you have difficulty moving around from place to place? No   Do you have trouble with steps or getting out of a bed or a chair? Yes   Current Diet Well Balanced Diet   Dental Exam Up to date   Eye Exam Up to date   Exercise (times per week) 7 times per week   Current Exercises Include Walking   Do you need help using the phone?  No   Are you deaf or do you have serious difficulty hearing?  No   Do you need help to go to places out of walking distance? No   Do you need help shopping? No   Do you need help preparing meals?  No   Do you need help with housework?  No   Do you need help with laundry? No   Do you need help taking your medications? No   Do you need help managing money? No   Do you ever drive " or ride in a car without wearing a seat belt? No   Have you felt unusual stress, anger or loneliness in the last month? No   Who do you live with? Community   If you need help, do you have trouble finding someone available to you? No   Have you been bothered in the last four weeks by sexual problems? No   Do you have difficulty concentrating, remembering or making decisions? No       Age-appropriate Screening Schedule:  Refer to the list below for future screening recommendations based on patient's age, sex and/or medical conditions. Orders for these recommended tests are listed in the plan section. The patient has been provided with a written plan.    Health Maintenance   Topic Date Due    COLORECTAL CANCER SCREENING  Never done    HEPATITIS C SCREENING  Never done    INFLUENZA VACCINE  08/01/2024    ANNUAL PHYSICAL  06/26/2025    MAMMOGRAM  08/15/2025    COVID-19 Vaccine  Completed    RSV Vaccine - Adults  Completed    Pneumococcal Vaccine 0-64  Completed    ZOSTER VACCINE  Completed              Outpatient Medications Prior to Visit   Medication Sig Dispense Refill    albuterol (PROVENTIL) (2.5 MG/3ML) 0.083% nebulizer solution       albuterol sulfate  (90 Base) MCG/ACT inhaler Inhale 2 puffs Every 4 (Four) Hours As Needed.      amLODIPine (NORVASC) 5 MG tablet Take 1 tablet by mouth Daily. 90 tablet 1    ARIPiprazole (ABILIFY) 5 MG tablet Take 1 tablet by mouth Daily. 90 tablet 1    Arnuity Ellipta 200 MCG/ACT aerosol powder        ascorbic acid (VITAMIN C) 500 MG tablet Vitamin C 500 mg oral tablet take 1 tablet by oral route daily   Active      aspirin (aspirin) 81 MG EC tablet aspirin 81 mg oral tablet,delayed release (DR/EC) take 1 tablet (81 mg) by oral route once daily   Active      carvedilol (COREG) 12.5 MG tablet Take 1 tablet by mouth 2 (Two) Times a Day With Meals. 180 tablet 1    cetirizine (zyrTEC) 10 MG tablet Take 1 tablet by mouth Daily. 90 tablet 1    Cholecalciferol 50 MCG (2000 UT)  capsule       Diclofenac Sodium (VOLTAREN) 1 % gel gel Apply  topically to the appropriate area as directed 4 (Four) Times a Day.      diphenhydrAMINE (Benadryl Allergy) 25 mg capsule Benadryl 25 mg oral capsule take 2 capsules (50 mg) by oral route once daily at bedtime as needed   Active      docusate sodium (COLACE) 50 MG capsule Take  by mouth 2 (Two) Times a Day As Needed for Constipation.      DULoxetine (CYMBALTA) 60 MG capsule Take 1 capsule by mouth Daily. 90 capsule 1    EPINEPHrine (EpiPen 2-Edwin) 0.3 MG/0.3ML solution auto-injector injection       fluticasone (FLONASE) 50 MCG/ACT nasal spray 2 sprays into the nostril(s) as directed by provider Daily. 15.8 mL 2    folic acid (FOLVITE) 800 MCG tablet folic acid 800 mcg oral tablet take 1 tablet (0.8 mg) by oral route once daily   Active      guaiFENesin (Mucinex) 600 MG 12 hr tablet Mucinex 600 mg oral tablet extended release 12hr take 2 tablets (1,200 mg) by oral route every 12 hours as needed   Active      ibuprofen (ADVIL,MOTRIN) 800 MG tablet Take 1 tablet by mouth 3 (Three) Times a Day.      lamoTRIgine (LaMICtal) 25 MG tablet lamotrigine 25 mg oral tablet take 1 tablet by oral route 2 times a day   Active      montelukast (SINGULAIR) 10 MG tablet montelukast 10 mg oral tablet take 1 tablet (10 mg) by oral route once daily in the evening   Active      omeprazole (priLOSEC) 40 MG capsule Take 1 capsule by mouth Daily. 90 capsule 1    promethazine-dextromethorphan (PROMETHAZINE-DM) 6.25-15 MG/5ML syrup Take 5 mL by mouth 4 (Four) Times a Day As Needed for Cough. 180 mL 0    rosuvastatin (CRESTOR) 10 MG tablet Take 1 tablet by mouth Daily. 90 tablet 1    Spiriva Respimat 1.25 MCG/ACT aerosol solution inhaler       Umeclidinium Bromide (Incruse Ellipta) 62.5 MCG/INH aerosol powder  Incruse Ellipta 62.5 mcg/actuation inhalation blister with device inhale 1 puff (62.5 mcg) by inhalation route once daily at the same time each day   Active       No  facility-administered medications prior to visit.       CMS Preventative Services Quick Reference  Risk Factors Identified During Encounter  Chronic Pain: OTC analgesics as needed. Proper dosing schedule discussed.   Depression/Dysphoria: Current medication is effective, no change recommended  Glaucoma or Family History of Glaucoma:   [facility] yearly eye exams  Immunizations Discussed/Encouraged:  UTD  Dental Screening Recommended  Vision Screening Recommended  The above risks/problems have been discussed with the patient.  Pertinent information has been shared with the patient in the After Visit Summary.  An After Visit Summary and PPPS were made available to the patient.    Follow Up:   Next Medicare Wellness visit to be scheduled in 1 year.       Additional E&M Note during same encounter follows:  Patient has multiple medical problems which are significant and separately identifiable that require additional work above and beyond the Medicare Wellness Visit.         Laura Villarreal presents to [facility].     Assessment and Plan:   Diagnoses and all orders for this visit:    1. Encounter for annual wellness exam in Medicare patient (Primary)    2. Encounter for screening colonoscopy  Comments:  Due for screening colonoscopy reviewed she would like to see Dr. Pacheco  Orders:  -     Cancel: Ambulatory Referral For Screening Colonoscopy  -     Ambulatory Referral For Screening Colonoscopy    3. Screening mammogram for breast cancer  Comments:  due for screening mammogram  Orders:  -     Mammo Screening Digital Tomosynthesis Bilateral With CAD; Future    4. Encounter for immunization  Comments:  Immunizations are up-to-date    5. Postmenopausal state  Comments:  DEXA is up-to-date    6. Intrinsic eczema  Comments:  Will change betamethasone to triamcinolone with antiyeast nystatin.  She is to call if improvement or worsening symptoms.  History of hives on oral prednisone    7. Mild  intermittent asthma without complication  Comments:  Stable and doing well.  Allergies are under good control on cetirizine    8. Non-seasonal allergic rhinitis due to pollen  Comments:  Stable and well-controlled on cetirizine    9. Bruising  Comments:  Will check a CBC    10. Primary osteoarthritis of both knees  Comments:  F/u with orthopedics as directed.  She needs bilateral knee replacement.  Will start topical Voltaren gel, cont turmeric and start osteochondritic/glucosamine    11. Encounter for screening for other viral diseases  Comments:  will check Hep C lab    Other orders  -     nystatin-triamcinolone (MYCOLOG) 603485-7.1 UNIT/GM-% ointment; Apply 1 Application topically to the appropriate area as directed 2 (Two) Times a Day.  Dispense: 30 g; Refill: 0                    Medications:      Current Outpatient Medications:     albuterol (PROVENTIL) (2.5 MG/3ML) 0.083% nebulizer solution, , Disp: , Rfl:     albuterol sulfate  (90 Base) MCG/ACT inhaler, Inhale 2 puffs Every 4 (Four) Hours As Needed., Disp: , Rfl:     amLODIPine (NORVASC) 5 MG tablet, Take 1 tablet by mouth Daily., Disp: 90 tablet, Rfl: 1    ARIPiprazole (ABILIFY) 5 MG tablet, Take 1 tablet by mouth Daily., Disp: 90 tablet, Rfl: 1    Arnuity Ellipta 200 MCG/ACT aerosol powder , , Disp: , Rfl:     ascorbic acid (VITAMIN C) 500 MG tablet, Vitamin C 500 mg oral tablet take 1 tablet by oral route daily   Active, Disp: , Rfl:     aspirin (aspirin) 81 MG EC tablet, aspirin 81 mg oral tablet,delayed release (DR/EC) take 1 tablet (81 mg) by oral route once daily   Active, Disp: , Rfl:     carvedilol (COREG) 12.5 MG tablet, Take 1 tablet by mouth 2 (Two) Times a Day With Meals., Disp: 180 tablet, Rfl: 1    cetirizine (zyrTEC) 10 MG tablet, Take 1 tablet by mouth Daily., Disp: 90 tablet, Rfl: 1    Cholecalciferol 50 MCG (2000 UT) capsule, , Disp: , Rfl:     Diclofenac Sodium (VOLTAREN) 1 % gel gel, Apply  topically to the appropriate area as  directed 4 (Four) Times a Day., Disp: , Rfl:     diphenhydrAMINE (Benadryl Allergy) 25 mg capsule, Benadryl 25 mg oral capsule take 2 capsules (50 mg) by oral route once daily at bedtime as needed   Active, Disp: , Rfl:     docusate sodium (COLACE) 50 MG capsule, Take  by mouth 2 (Two) Times a Day As Needed for Constipation., Disp: , Rfl:     DULoxetine (CYMBALTA) 60 MG capsule, Take 1 capsule by mouth Daily., Disp: 90 capsule, Rfl: 1    EPINEPHrine (EpiPen 2-Edwin) 0.3 MG/0.3ML solution auto-injector injection, , Disp: , Rfl:     fluticasone (FLONASE) 50 MCG/ACT nasal spray, 2 sprays into the nostril(s) as directed by provider Daily., Disp: 15.8 mL, Rfl: 2    folic acid (FOLVITE) 800 MCG tablet, folic acid 800 mcg oral tablet take 1 tablet (0.8 mg) by oral route once daily   Active, Disp: , Rfl:     guaiFENesin (Mucinex) 600 MG 12 hr tablet, Mucinex 600 mg oral tablet extended release 12hr take 2 tablets (1,200 mg) by oral route every 12 hours as needed   Active, Disp: , Rfl:     ibuprofen (ADVIL,MOTRIN) 800 MG tablet, Take 1 tablet by mouth 3 (Three) Times a Day., Disp: , Rfl:     lamoTRIgine (LaMICtal) 25 MG tablet, lamotrigine 25 mg oral tablet take 1 tablet by oral route 2 times a day   Active, Disp: , Rfl:     montelukast (SINGULAIR) 10 MG tablet, montelukast 10 mg oral tablet take 1 tablet (10 mg) by oral route once daily in the evening   Active, Disp: , Rfl:     nystatin-triamcinolone (MYCOLOG) 009665-5.1 UNIT/GM-% ointment, Apply 1 Application topically to the appropriate area as directed 2 (Two) Times a Day., Disp: 30 g, Rfl: 0    omeprazole (priLOSEC) 40 MG capsule, Take 1 capsule by mouth Daily., Disp: 90 capsule, Rfl: 1    promethazine-dextromethorphan (PROMETHAZINE-DM) 6.25-15 MG/5ML syrup, Take 5 mL by mouth 4 (Four) Times a Day As Needed for Cough., Disp: 180 mL, Rfl: 0    rosuvastatin (CRESTOR) 10 MG tablet, Take 1 tablet by mouth Daily., Disp: 90 tablet, Rfl: 1    Spiriva Respimat 1.25 MCG/ACT  aerosol solution inhaler, , Disp: , Rfl:     Umeclidinium Bromide (Incruse Ellipta) 62.5 MCG/INH aerosol powder , Incruse Ellipta 62.5 mcg/actuation inhalation blister with device inhale 1 puff (62.5 mcg) by inhalation route once daily at the same time each day   Active, Disp: , Rfl:        Objective   Vital Signs:   /83   Pulse 96   Temp 98.1 °F (36.7 °C)   Resp 18   Wt 104 kg (229 lb)   BMI 36.96 kg/m²         Follow Up   No follow-ups on file.    Part of this note may be an electronic transcription/translation of spoken language to printed   text using the Dragon Dictation System.            Medical History:  Past Medical History:    Anxiety    Asthma    EKG abnormalities    H/O degenerative disc disease    Hoarseness    Lumbago    Sinus drainage    Vitamin C deficiency     Past Surgical History:    ADENOIDECTOMY    HYSTERECTOMY    KNEE SURGERY    SINUS SURGERY    TONSILLECTOMY      Family History   Problem Relation Age of Onset    Heart disease Father     Other Father         SPINE PROBLEMS    Diabetes Father     Cancer Maternal Grandfather     Canavan disease Paternal Grandmother     Heart disease Other         UNCLE     Social History     Tobacco Use    Smoking status: Never    Smokeless tobacco: Never   Substance Use Topics    Alcohol use: Never       Health Maintenance Due   Topic Date Due    COLORECTAL CANCER SCREENING  Never done    HEPATITIS C SCREENING  Never done        Immunization History   Administered Date(s) Administered    Arexvy (RSV, Adults 60+ yrs) 01/03/2024    COVID-19 (PFIZER) BIVALENT 12+YRS 10/12/2022    COVID-19 (PFIZER) Purple Cap Monovalent 01/22/2021, 02/12/2021, 10/31/2021, 04/26/2022    COVID-19 F23 (PFIZER) 12YRS+ (COMIRNATY) 10/25/2023    Pneumococcal Conjugate 13-Valent (PCV13) 11/01/2016    Pneumococcal Conjugate 20-Valent (PCV20) 09/27/2023    Pneumococcal Polysaccharide (PPSV23) 01/01/2018    Shingrix 09/11/2019, 10/09/2020       Allergies   Allergen Reactions     Fish-Derived Products Anaphylaxis     PT REPORTS SHE IS HIGHLY ALLERGIC TO ALL SHELLFISH, SEAFOOD INCLUDING TUNA, AND SHRIMP     Nuts Anaphylaxis     PT CARRIES AN EPI PEN FOR BOTH SEAFOOD AND NUT ALLERGIES     Niacin Hives    Penicillins Hives    Sulfa Antibiotics Hives    Tetanus Toxoids Hives    Keflex [Cephalexin] Unknown - Low Severity    Latex Hives    Prednisone Hives and Rash

## 2024-06-26 NOTE — PROGRESS NOTES
Laura Villarreal presents for an evaluation physician visit at the [facility] [place]    Chief Complaint:  follow up for med review, has ear eczema, knee pain      Subjective      History of Present Illness    She is on allergy shots she is also currently on cetirizine 10 mg daily.      She is bruising more easily, h/o anemia in past    Worsening knee pain in both knees, she has been to ortho, told she is bone on bone and needs B knee replacements, she is on tumeric, wants to know if collagen is helpful       She has chronic GERD and is on omeprazole 40 mg daily     She has underlying history of hypertension is currently on amlodipine, carvedilol and BP are stable and well controlled     Her depression is well-controlled with Cymbalta 60 mg daily/Abilify 5 mg daily, she is stable, sleeping well, no SI/HI    She presents with hypercholesterolemia.  Current treatment is low-cholesterol diet and rosuvastatin 10 mg nightly.  She tolerates medication well       Labs 1/17/2024 creatinine 0.72, GFR 93, sodium 142, calcium 9.4 bilirubin 0.4 alkaline phosphatase 131 AST 22 ALT 21 cholesterol 200 triglycerides 213 HDL 46  hemoglobin A1c 6.2        Objective   Vital Signs:   /83   Pulse 96   Temp 98.1 °F (36.7 °C)   Resp 18   Wt 104 kg (229 lb)   BMI 36.96 kg/m²     Physical Exam  Vitals and nursing note reviewed.   Constitutional:       General: She is not in acute distress.     Appearance: Normal appearance. She is not ill-appearing, toxic-appearing or diaphoretic.   HENT:      Head: Normocephalic and atraumatic.      Right Ear: Tympanic membrane, ear canal and external ear normal.      Left Ear: Tympanic membrane, ear canal and external ear normal.      Nose: No congestion or rhinorrhea.      Mouth/Throat:      Mouth: Mucous membranes are moist.      Pharynx: Oropharynx is clear. No oropharyngeal exudate or posterior oropharyngeal erythema.   Eyes:      Extraocular Movements: Extraocular movements  intact.      Conjunctiva/sclera: Conjunctivae normal.      Pupils: Pupils are equal, round, and reactive to light.   Cardiovascular:      Rate and Rhythm: Normal rate and regular rhythm.      Heart sounds: Normal heart sounds.   Pulmonary:      Effort: Pulmonary effort is normal.      Breath sounds: Normal breath sounds. No wheezing, rhonchi or rales.   Abdominal:      General: Abdomen is flat.      Palpations: Abdomen is soft. There is no mass.      Tenderness: There is no abdominal tenderness.      Hernia: No hernia is present.   Musculoskeletal:      Cervical back: Neck supple. No rigidity.      Right lower leg: No edema.      Left lower leg: No edema.   Lymphadenopathy:      Cervical: No cervical adenopathy.   Skin:     General: Skin is warm and dry.      Comments: Eczema with excoriation ear lobs B   Neurological:      General: No focal deficit present.      Mental Status: She is alert and oriented to person, place, and time. Mental status is at baseline.   Psychiatric:         Mood and Affect: Mood normal.         Behavior: Behavior normal.         Thought Content: Thought content normal.         Judgment: Judgment normal.       BMI cannot be calculated due to outdated height or weight values.  Please input a current height/weight in Vitals and re-renter BMIFOLLOWUP in Note to pull in correct documentation based on BMI range.          Assessment and Plan   Diagnoses and all orders for this visit:    1. Encounter for annual wellness exam in Medicare patient (Primary)    2. Encounter for screening colonoscopy  Comments:  Due for screening colonoscopy reviewed she would like to see Dr. Pacheco  Orders:  -     Cancel: Ambulatory Referral For Screening Colonoscopy  -     Ambulatory Referral For Screening Colonoscopy    3. Screening mammogram for breast cancer  Comments:  due for screening mammogram  Orders:  -     Mammo Screening Digital Tomosynthesis Bilateral With CAD; Future    4. Encounter for  immunization  Comments:  Immunizations are up-to-date    5. Postmenopausal state  Comments:  DEXA is up-to-date    6. Intrinsic eczema  Comments:  Will change betamethasone to triamcinolone with antiyeast nystatin.  She is to call if improvement or worsening symptoms.  History of hives on oral prednisone    7. Mild intermittent asthma without complication  Comments:  Stable and doing well.  Allergies are under good control on cetirizine    8. Non-seasonal allergic rhinitis due to pollen  Comments:  Stable and well-controlled on cetirizine    9. Bruising  Comments:  Will check a CBC    10. Primary osteoarthritis of both knees  Comments:  F/u with orthopedics as directed.  She needs bilateral knee replacement.  Will start topical Voltaren gel, cont turmeric and start osteochondritic/glucosamine    11. Encounter for screening for other viral diseases  Comments:  will check Hep C lab    Other orders  -     nystatin-triamcinolone (MYCOLOG) 539401-7.1 UNIT/GM-% ointment; Apply 1 Application topically to the appropriate area as directed 2 (Two) Times a Day.  Dispense: 30 g; Refill: 0        Follow Up   No follow-ups on file.    Review of Systems   Constitutional:  Negative for chills, fatigue and fever.   HENT:  Negative for ear pain, sinus pressure and sore throat.    Eyes:  Negative for blurred vision and double vision.   Respiratory:  Negative for cough, shortness of breath and wheezing.    Cardiovascular:  Negative for chest pain and palpitations.   Gastrointestinal:  Negative for abdominal pain, blood in stool, constipation, diarrhea, nausea and vomiting.   Musculoskeletal:  Positive for arthralgias.   Skin:  Positive for rash.   Neurological:  Negative for dizziness and headache.   Psychiatric/Behavioral:  Negative for sleep disturbance, suicidal ideas and depressed mood. The patient is not nervous/anxious.         Result Review   The following data was reviewed by Corinne Randolph MD on 06/26/2024.  No results  "found for: \"WBC\", \"HGB\", \"HCT\", \"MCV\", \"PLT\"  No results found for: \"GLUCOSE\", \"BUN\", \"CREATININE\", \"EGFRRESULT\", \"EGFR\", \"BCR\", \"K\", \"CO2\", \"CALCIUM\", \"PROTENTOTREF\", \"ALBUMIN\", \"BILITOT\", \"AST\", \"ALT\"  No results found for: \"CHOL\", \"CHLPL\", \"TRIG\", \"HDL\", \"LDL\", \"LDLDIRECT\"  No results found for: \"TSH\"  No results found for: \"HGBA1C\"  No results found for: \"PSA\"    Part of this note may be an electronic transcription/translation of spoken language to printed   text using the Dragon Dictation System.      "

## 2024-06-27 ENCOUNTER — DOCUMENTATION (OUTPATIENT)
Dept: FAMILY MEDICINE CLINIC | Age: 64
End: 2024-06-27
Payer: COMMERCIAL

## 2024-06-27 RX ORDER — NYSTATIN AND TRIAMCINOLONE ACETONIDE 100000; 1 [USP'U]/G; MG/G
1 OINTMENT TOPICAL 2 TIMES DAILY
Qty: 30 G | Refills: 0 | Status: SHIPPED | OUTPATIENT
Start: 2024-06-27

## 2024-07-25 DIAGNOSIS — Z12.31 SCREENING MAMMOGRAM FOR BREAST CANCER: ICD-10-CM

## 2024-07-26 ENCOUNTER — NURSING HOME (OUTPATIENT)
Dept: FAMILY MEDICINE CLINIC | Age: 64
End: 2024-07-26
Payer: MEDICARE

## 2024-07-26 VITALS
RESPIRATION RATE: 18 BRPM | SYSTOLIC BLOOD PRESSURE: 133 MMHG | DIASTOLIC BLOOD PRESSURE: 81 MMHG | TEMPERATURE: 97.3 F | OXYGEN SATURATION: 95 % | WEIGHT: 229 LBS | BODY MASS INDEX: 36.96 KG/M2 | HEART RATE: 102 BPM

## 2024-07-26 DIAGNOSIS — J45.20 MILD INTERMITTENT ASTHMA WITHOUT COMPLICATION: ICD-10-CM

## 2024-07-26 DIAGNOSIS — H60.543 DERMATITIS OF EAR CANAL, BILATERAL: Primary | ICD-10-CM

## 2024-07-26 DIAGNOSIS — R00.0 TACHYCARDIA: ICD-10-CM

## 2024-07-26 DIAGNOSIS — I10 ESSENTIAL HYPERTENSION: ICD-10-CM

## 2024-07-26 RX ORDER — CARVEDILOL 25 MG/1
25 TABLET ORAL 2 TIMES DAILY WITH MEALS
Qty: 60 TABLET | Refills: 5 | Status: SHIPPED | OUTPATIENT
Start: 2024-07-26

## 2024-07-26 RX ORDER — BETAMETHASONE DIPROPIONATE 0.05 %
1 OINTMENT (GRAM) TOPICAL 2 TIMES DAILY
Qty: 45 G | Refills: 0 | Status: SHIPPED | OUTPATIENT
Start: 2024-07-26

## 2024-07-26 NOTE — PROGRESS NOTES
Laura Villarreal presents for an evaluation physician visit at the [facility] [place]    Chief Complaint:   ear canal is irritated and itchy      Subjective      History of Present Illness  Ears feels cakey rough and dry, not painful but irritating with itching and burning.  She is using topical nystatin.  Current treatment includes topical hydrocortisone which was ineffective to outer ear, and most recently nystatin with triamcinolone which has improved the appearance of the dermatitis but has not improved her symptoms as mentioned above    She has allergies and asthma and she is stable on allergy shots and cetirizine 10 mg daily.       She is bruising more easily, h/o anemia in past, bruises are minimal, not on aspirin     Chronic B knee pain in both knees, she has been to ortho, told she is bone on bone and needs B knee replacements, she is on tumeric, wants to know if collagen is helpful     Her chronic GERD is stable on omeprazole 40 mg daily     Chronic hypertension with tacchycardia, her BP is currently stable on amlodipine, carvedilol and she tolerates her meds well     Her depression remains stable and well-controlled with Cymbalta 60 mg daily/Abilify 5 mg daily, she is stable, sleeping well, no SI/HI     She presents with hypercholesterolemia, current treatment is low-cholesterol diet and rosuvastatin 10 mg nightly.  She tolerates medication well.              Objective   Vital Signs:   /81   Pulse 102   Temp 97.3 °F (36.3 °C)   Resp 18   Wt 104 kg (229 lb)   SpO2 95%   BMI 36.96 kg/m²     Physical Exam  Vitals reviewed.   Constitutional:       General: She is not in acute distress.     Appearance: Normal appearance. She is normal weight. She is not ill-appearing.   HENT:      Head: Normocephalic and atraumatic.   Eyes:      Extraocular Movements: Extraocular movements intact.      Pupils: Pupils are equal, round, and reactive to light.   Cardiovascular:      Rate and Rhythm: Normal  rate and regular rhythm.      Heart sounds: No murmur heard.  Pulmonary:      Effort: Pulmonary effort is normal. No respiratory distress.      Breath sounds: No stridor. No wheezing or rales.   Skin:     Comments: Dry scaling skin consistent with dermatitis and mild erythema in the right inner and outer ear canal.  The left ear bothers her and is pruritic but no abnormal skin findings appreciated   Neurological:      General: No focal deficit present.      Mental Status: She is alert and oriented to person, place, and time.   Psychiatric:         Mood and Affect: Mood normal.         Behavior: Behavior normal.         Thought Content: Thought content normal.         Judgment: Judgment normal.     BMI cannot be calculated due to outdated height or weight values.  Please input a current height/weight in Vitals and re-renter BMIFOLLOWUP in Note to pull in correct documentation based on BMI range.          Assessment and Plan   Diagnoses and all orders for this visit:    1. Dermatitis of ear canal, bilateral (Primary)  Comments:  Will stop nystatin/triamcinolone and start topical betamethasone 0.05% ointment twice daily and Cortisporin into the inner ear canal bilaterally x 10 days  Orders:  -     betamethasone dipropionate (DIPROLENE) 0.05 % ointment; Apply 1 Application topically to the appropriate area as directed 2 (Two) Times a Day. Apply to outer ear canals B bid x 10 days  Dispense: 45 g; Refill: 0  -     neomycin-polymyxin-hydrocortisone (CORTISPORIN) 3.5-34500-7 otic solution; Administer 3 drops into both ears 4 (Four) Times a Day.  Dispense: 10 mL; Refill: 0    2. Mild intermittent asthma without complication  Comments:  No acute issues stable on current treatment plan    3. Essential hypertension  Comments:  Blood pressure stable on current treatment plan but pulse still elevated, will increase carvedilol to 25 mg bid and she is to check BP and call if BP<115/60  Orders:  -     carvedilol (Coreg) 25 MG  "tablet; Take 1 tablet by mouth 2 (Two) Times a Day With Meals.  Dispense: 60 tablet; Refill: 5    4. Tachycardia  Comments:  Blood pressure stable on current treatment plan but pulse still elevated, will increase carvedilol to 25 mg bid and she is to check BP and call if BP<115/60        Follow Up   Return in about 3 months (around 10/26/2024) for Recheck.    Review of Systems   Constitutional:  Negative for chills and fever.   HENT:  Positive for congestion. Negative for ear pain, sinus pressure and sore throat.    Eyes:  Negative for blurred vision and double vision.   Respiratory:  Negative for cough, shortness of breath and wheezing.    Cardiovascular:  Negative for chest pain and palpitations.   Gastrointestinal:  Negative for abdominal pain, blood in stool, constipation, diarrhea, nausea and vomiting.   Skin:  Positive for dry skin and rash.   Neurological:  Negative for dizziness and headache.   Psychiatric/Behavioral:  Negative for sleep disturbance, suicidal ideas and depressed mood. The patient is not nervous/anxious.         Result Review   The following data was reviewed by Corinne Randolph MD on 07/26/2024.  No results found for: \"WBC\", \"HGB\", \"HCT\", \"MCV\", \"PLT\"  No results found for: \"GLUCOSE\", \"BUN\", \"CREATININE\", \"EGFRRESULT\", \"EGFR\", \"BCR\", \"K\", \"CO2\", \"CALCIUM\", \"PROTENTOTREF\", \"ALBUMIN\", \"BILITOT\", \"AST\", \"ALT\"  No results found for: \"CHOL\", \"CHLPL\", \"TRIG\", \"HDL\", \"LDL\", \"LDLDIRECT\"  No results found for: \"TSH\"  No results found for: \"HGBA1C\"  No results found for: \"PSA\"    Part of this note may be an electronic transcription/translation of spoken language to printed   text using the Dragon Dictation System.      "

## 2024-08-26 ENCOUNTER — TELEPHONE (OUTPATIENT)
Dept: FAMILY MEDICINE CLINIC | Age: 64
End: 2024-08-26
Payer: MEDICARE

## 2024-08-26 NOTE — TELEPHONE ENCOUNTER
Patient has a duplicate mammogram order in chart from 6/26/24. This was completed on 7/22/24. Is it okay to cancel duplicate?

## 2024-08-28 ENCOUNTER — NURSING HOME (OUTPATIENT)
Dept: FAMILY MEDICINE CLINIC | Age: 64
End: 2024-08-28
Payer: MEDICARE

## 2024-08-28 DIAGNOSIS — I10 ESSENTIAL HYPERTENSION: ICD-10-CM

## 2024-08-28 DIAGNOSIS — H60.391 OTHER INFECTIVE ACUTE OTITIS EXTERNA OF RIGHT EAR: Primary | ICD-10-CM

## 2024-08-28 DIAGNOSIS — J45.20 MILD INTERMITTENT ASTHMA WITHOUT COMPLICATION: ICD-10-CM

## 2024-08-28 DIAGNOSIS — J30.9 ALLERGIC RHINITIS, UNSPECIFIED SEASONALITY, UNSPECIFIED TRIGGER: ICD-10-CM

## 2024-08-28 DIAGNOSIS — H60.543 DERMATITIS OF EAR CANAL, BILATERAL: ICD-10-CM

## 2024-08-28 RX ORDER — CIPROFLOXACIN AND DEXAMETHASONE 3; 1 MG/ML; MG/ML
4 SUSPENSION/ DROPS AURICULAR (OTIC) 2 TIMES DAILY
Qty: 7.5 ML | Refills: 0 | Status: SHIPPED | OUTPATIENT
Start: 2024-08-28

## 2024-08-28 NOTE — PROGRESS NOTES
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  No ST.  She gets frequent sinusitis and has chronic sinus congestion.  She was recently treated with external antibiotic and steroid eardrops for otitis externa.  She is also been on nystatin and hydrocortisone for outer earlobe itching and burning  She has allergies and asthma and she is stable on allergy shots and cetirizine 10 mg daily.      She has hypertension,  is on carvedilol, threw it up on higher dose of 25 mg bid so she stopped the higher dose, now on 12.5 mg bid and tolerating, no more nausea, pulse is elevated but under 100 and she has a chronically elevated pulse since she was young.    She has sleep apnea, uses cpap and wants to know about a new device to hold her jaw closed    Chronic B knee pain in both knees, she has been to ortho, told she is bone on bone and needs B knee replacements, she is on tumeric, wants to know if collagen is helpful     Her chronic GERD is stable on omeprazole 40 mg daily     Her depression remains stable and well-controlled with Cymbalta 60 mg daily/Abilify 5 mg daily, she is stable, sleeping well, no SI/HI     She presents with hypercholesterolemia, current treatment is rosuvastatin 10 mg nightly.  She tolerates medication well.              Objective   Vital Signs:   There were no vitals taken for this visit.    Physical Exam  Vitals reviewed.   Constitutional:       General: She is not in acute distress.     Appearance: Normal appearance. She is normal weight. She is not ill-appearing.   HENT:      Head: Normocephalic and atraumatic.      Right Ear: Tympanic membrane normal. Drainage and swelling present. No middle ear effusion. There is no impacted cerumen.      Left Ear: Tympanic membrane, ear canal and external ear  normal. There is no impacted cerumen.      Nose: Congestion and rhinorrhea present.   Eyes:      Extraocular Movements: Extraocular movements intact.      Pupils: Pupils are equal, round, and reactive to light.   Cardiovascular:      Rate and Rhythm: Normal rate and regular rhythm.      Heart sounds: No murmur heard.  Pulmonary:      Effort: Pulmonary effort is normal. No respiratory distress.      Breath sounds: No stridor. No wheezing or rales.   Skin:     Comments: Dry scaling skin consistent with dermatitis and mild erythema in the right inner and outer ear canal.  The left ear bothers her and is pruritic but no abnormal skin findings appreciated   Neurological:      General: No focal deficit present.      Mental Status: She is alert and oriented to person, place, and time.   Psychiatric:         Mood and Affect: Mood normal.         Behavior: Behavior normal.         Thought Content: Thought content normal.         Judgment: Judgment normal.     BMI cannot be calculated due to outdated height or weight values.  Please input a current height/weight in Vitals and re-renter BMIFOLLOWUP in Note to pull in correct documentation based on BMI range.          Assessment and Plan   Diagnoses and all orders for this visit:    1. Other infective acute otitis externa of right ear (Primary)  Comments:  Will change eardrops to Ciprodex for otitis externa, if no improvement she may require an oral antibiotic    2. Dermatitis of ear canal, bilateral  Comments:  Okay to continue topical steroid with topical antifungal cream    3. Mild intermittent asthma without complication  Comments:  Stable.  No changes in current treatment plan    4. Essential hypertension  Comments:  Blood pressure stable and well-controlled    5. Allergic rhinitis, unspecified seasonality, unspecified trigger  Comments:  Chronic, she is overall stable on current treatment plan    Other orders  -     ciprofloxacin-dexAMETHasone (Ciprodex) 0.3-0.1 % otic  "suspension; Administer 4 drops to the right ear 2 (Two) Times a Day.  Dispense: 7.5 mL; Refill: 0          Follow Up   No follow-ups on file.    Review of Systems   Constitutional:  Negative for chills and fever.   HENT:  Positive for congestion and ear pain. Negative for sinus pressure and sore throat.    Eyes:  Negative for blurred vision and double vision.   Respiratory:  Negative for cough, shortness of breath and wheezing.    Cardiovascular:  Negative for chest pain and palpitations.   Gastrointestinal:  Negative for abdominal pain, blood in stool, constipation, diarrhea, nausea and vomiting.   Skin:  Positive for dry skin and rash.   Neurological:  Negative for dizziness and headache.   Psychiatric/Behavioral:  Negative for sleep disturbance, suicidal ideas and depressed mood. The patient is not nervous/anxious.         Result Review   The following data was reviewed by Corinne Randolph MD on 07/26/2024.  No results found for: \"WBC\", \"HGB\", \"HCT\", \"MCV\", \"PLT\"  No results found for: \"GLUCOSE\", \"BUN\", \"CREATININE\", \"EGFRRESULT\", \"EGFR\", \"BCR\", \"K\", \"CO2\", \"CALCIUM\", \"PROTENTOTREF\", \"ALBUMIN\", \"BILITOT\", \"AST\", \"ALT\"  No results found for: \"CHOL\", \"CHLPL\", \"TRIG\", \"HDL\", \"LDL\", \"LDLDIRECT\"  No results found for: \"TSH\"  No results found for: \"HGBA1C\"  No results found for: \"PSA\"    Part of this note may be an electronic transcription/translation of spoken language to printed   text using the Dragon Dictation System.      "

## 2024-10-02 ENCOUNTER — TELEMEDICINE (OUTPATIENT)
Dept: FAMILY MEDICINE CLINIC | Age: 64
End: 2024-10-02
Payer: MEDICARE

## 2024-10-02 VITALS
TEMPERATURE: 98.3 F | BODY MASS INDEX: 36.96 KG/M2 | RESPIRATION RATE: 18 BRPM | DIASTOLIC BLOOD PRESSURE: 70 MMHG | WEIGHT: 230 LBS | SYSTOLIC BLOOD PRESSURE: 145 MMHG | OXYGEN SATURATION: 97 % | HEART RATE: 86 BPM | HEIGHT: 66 IN

## 2024-10-02 DIAGNOSIS — R00.0 TACHYCARDIA: Primary | ICD-10-CM

## 2024-10-02 DIAGNOSIS — H60.391 OTHER INFECTIVE ACUTE OTITIS EXTERNA OF RIGHT EAR: ICD-10-CM

## 2024-10-02 DIAGNOSIS — R73.03 BORDERLINE DIABETES: ICD-10-CM

## 2024-10-02 DIAGNOSIS — J45.20 MILD INTERMITTENT ASTHMA WITHOUT COMPLICATION: ICD-10-CM

## 2024-10-02 DIAGNOSIS — F32.5 MAJOR DEPRESSIVE DISORDER WITH SINGLE EPISODE, IN FULL REMISSION: ICD-10-CM

## 2024-10-02 DIAGNOSIS — H60.543 DERMATITIS OF EAR CANAL, BILATERAL: ICD-10-CM

## 2024-10-02 DIAGNOSIS — I10 ESSENTIAL HYPERTENSION: ICD-10-CM

## 2024-10-02 DIAGNOSIS — E66.01 CLASS 2 SEVERE OBESITY DUE TO EXCESS CALORIES WITH SERIOUS COMORBIDITY AND BODY MASS INDEX (BMI) OF 37.0 TO 37.9 IN ADULT: ICD-10-CM

## 2024-10-02 DIAGNOSIS — E66.812 CLASS 2 SEVERE OBESITY DUE TO EXCESS CALORIES WITH SERIOUS COMORBIDITY AND BODY MASS INDEX (BMI) OF 37.0 TO 37.9 IN ADULT: ICD-10-CM

## 2024-10-02 PROCEDURE — 99349 HOME/RES VST EST MOD MDM 40: CPT | Performed by: FAMILY MEDICINE

## 2024-10-02 PROCEDURE — 3078F DIAST BP <80 MM HG: CPT | Performed by: FAMILY MEDICINE

## 2024-10-02 PROCEDURE — 3077F SYST BP >= 140 MM HG: CPT | Performed by: FAMILY MEDICINE

## 2024-10-02 RX ORDER — ARIPIPRAZOLE 2 MG/1
2 TABLET ORAL DAILY
Qty: 90 TABLET | Refills: 1 | Status: SHIPPED | OUTPATIENT
Start: 2024-10-02

## 2024-10-02 NOTE — PROGRESS NOTES
Laura Villarreal presents for an evaluation physician visit at the [facility] [place]   Patient is being seen, via telehealth, and pt is not in the office.  Mychart or doximetry was used for this visit. Laura and I were able to hear each other simultaneously in real time. I introduced myself and verified Laura identity. I explained how the telemedicine visit will occur. Laura is aware they may terminate the telemedicine encounter and withdraw his/her consent for receiving care via telemedicine without affecting their ability to receive future care from us, and that I may also terminate the telemedicine encounter if I determine that an in-person visit is more appropriate for the condition[s] for which treatment is sought.  Laura verbally gave consent for the use of telemedicine in her care. 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 Her crusty formation outer ear with itching and burning is improving.  She has had ear issues for a couple months now.       She has allergies and asthma and she is stable on allergy shots and cetirizine 10 mg daily.      She has hypertension,  is on amlodipine and carvedilol, threw it up on higher dose of 25 mg bid so she stopped the higher dose, now on metoprolol 12.5 mg bid and amlodipine 5 mg bid and tolerating ok, pulse is elevated at 97 today but under 100 and she has a chronically elevated pulse since she was young. Pulse is still running high.  BP is not at goal.      She has sleep apnea, uses cpap and wants to know about a new device to hold her jaw closed    History of chronic B knee pain in both knees, sees ortho,  "told she is bone on bone and needs B knee replacements, she is on tumeric and osteochondritic with glucosamine.  She has trouble swallowing these big pills.  They have helped with her pain.  She would like to try to lose more weight     She presents with chronic GERD is stable on omeprazole 40 mg daily     Her depression remains stable and well-controlled with Cymbalta 60 mg daily/Abilify 5 mg bid, she is stable, sleeping well, no SI/HI     She presents with hypercholesterolemia, current treatment is rosuvastatin 10 mg nightly.  She tolerates medication well.    She has elevated  on Aug labs, she is trying to lose wt and unable to lose wt.     Labs: WBC 6.8, hgb 12.9, hct 41.1, platelets 230, , Na 140, K 3.9, ,  ALT 24, AST 27, GFR >60, Chol 149, , HDL 45, LDL 75      Objective   Vital Signs:   /70   Pulse 86   Temp 98.3 °F (36.8 °C)   Resp 18   Ht 167.6 cm (66\")   Wt 104 kg (230 lb)   SpO2 97%   BMI 37.12 kg/m²     Physical Exam  Vitals reviewed.   Constitutional:       General: She is not in acute distress.     Appearance: Normal appearance. She is normal weight. She is not ill-appearing.   HENT:      Head: Normocephalic and atraumatic.   Eyes:      Extraocular Movements: Extraocular movements intact.      Pupils: Pupils are equal, round, and reactive to light.   Pulmonary:      Effort: Pulmonary effort is normal.   Neurological:      General: No focal deficit present.      Mental Status: She is alert and oriented to person, place, and time.   Psychiatric:         Mood and Affect: Mood normal.         Behavior: Behavior normal.         Thought Content: Thought content normal.         Judgment: Judgment normal.     BMI cannot be calculated due to outdated height or weight values.  Please input a current height/weight in Vitals and re-renter BMIFOLLOWUP in Note to pull in correct documentation based on BMI range.          Assessment and Plan   Diagnoses and all orders for this " visit:    1. Tachycardia (Primary)  Comments:  Pulse back down to 86 with rest, will not change current treatment plan or medication    2. Other infective acute otitis externa of right ear  Comments:  secondary to fungal infection, cont ciprodex and she is to follow up with ENT to re eval    3. Dermatitis of ear canal, bilateral  Comments:  secondary to fungal  infection, cont ciprodex and she is to follow up with ENT to re eval    4. Mild intermittent asthma without complication  Comments:  stable, no acute issues, continue inhalers as directed    5. Essential hypertension  Comments:  Blood pressure stable and well-controlled.  No changes in current meds or treatment plan    6. Borderline diabetes  Comments:  encourage her to be more active, eat healthy and low carb, will set her up with nutrition to help her with wt loss and decrease her abilify to 2 mg daily    7. Class 2 severe obesity due to excess calories with serious comorbidity and body mass index (BMI) of 37.0 to 37.9 in adult  Comments:  encourage her to be more active, eat healthy and low carb,  will set her up with nutrition to help her with wt loss and decrease her abilify to 2 mg daily    8. Major depressive disorder with single episode, in full remission  Comments:  No acute issues today.  No changes to current meds or treatment plan  Orders:  -     ARIPiprazole (Abilify) 2 MG tablet; Take 1 tablet by mouth Daily.  Dispense: 90 tablet; Refill: 1            Follow Up   No follow-ups on file.    Review of Systems   Constitutional:  Negative for chills and fever.   HENT:  Negative for ear pain, sinus pressure and sore throat.    Eyes:  Negative for blurred vision and double vision.   Respiratory:  Negative for cough, shortness of breath and wheezing.    Cardiovascular:  Negative for chest pain and palpitations.   Gastrointestinal:  Negative for abdominal pain, blood in stool, constipation, diarrhea, nausea and vomiting.   Neurological:  Negative for  dizziness and headache.   Psychiatric/Behavioral:  Negative for sleep disturbance, suicidal ideas and depressed mood. The patient is not nervous/anxious.         Result Review   The following data was reviewed by Corinne Randolph MD on 07/26/2024.    Part of this note may be an electronic transcription/translation of spoken language to printed   text using the Dragon Dictation System.

## 2024-10-14 ENCOUNTER — OFFICE VISIT (OUTPATIENT)
Dept: FAMILY MEDICINE CLINIC | Age: 64
End: 2024-10-14
Payer: MEDICARE

## 2024-10-14 VITALS
DIASTOLIC BLOOD PRESSURE: 71 MMHG | SYSTOLIC BLOOD PRESSURE: 110 MMHG | HEIGHT: 66 IN | TEMPERATURE: 98.3 F | HEART RATE: 86 BPM | BODY MASS INDEX: 36.64 KG/M2 | OXYGEN SATURATION: 98 % | WEIGHT: 228 LBS

## 2024-10-14 DIAGNOSIS — R73.03 BORDERLINE DIABETES: ICD-10-CM
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
## 2024-10-14 DIAGNOSIS — J45.20 MILD INTERMITTENT ASTHMA WITHOUT COMPLICATION: ICD-10-CM

## 2024-10-14 DIAGNOSIS — I10 ESSENTIAL HYPERTENSION: ICD-10-CM

## 2024-10-14 DIAGNOSIS — L03.116 CELLULITIS OF LEFT LOWER EXTREMITY: Primary | ICD-10-CM

## 2024-10-14 PROCEDURE — 3074F SYST BP LT 130 MM HG: CPT | Performed by: FAMILY MEDICINE

## 2024-10-14 PROCEDURE — G2211 COMPLEX E/M VISIT ADD ON: HCPCS | Performed by: FAMILY MEDICINE

## 2024-10-14 PROCEDURE — 99213 OFFICE O/P EST LOW 20 MIN: CPT | Performed by: FAMILY MEDICINE

## 2024-10-14 PROCEDURE — 3078F DIAST BP <80 MM HG: CPT | Performed by: FAMILY MEDICINE

## 2024-10-14 RX ORDER — DOXYCYCLINE 100 MG/1
100 CAPSULE ORAL 2 TIMES DAILY
Qty: 14 CAPSULE | Refills: 0 | Status: SHIPPED | OUTPATIENT
Start: 2024-10-14

## 2024-10-14 RX ORDER — CLOTRIMAZOLE 1 G/ML
SOLUTION TOPICAL
COMMUNITY
Start: 2024-09-25

## 2024-10-14 RX ORDER — RIBOFLAVIN (VITAMIN B2) 100 MG
1 TABLET ORAL
COMMUNITY

## 2024-10-14 NOTE — PROGRESS NOTES
Laura Villarreal presents to [facility].    Chief Complaint: red and sore area on her left shin    Subjective     History of Present Illness:  KVNG Sanchez presents with acute onset of a rash on her left lower extremity with swelling and tenderness.  It is over her mid left shin.  She is unaware of any injury.  She is also unaware of any kind of bug bite.  It hurts when she walks.  She is in today for further eval and treatment           Assessment and Plan:   Diagnoses and all orders for this visit:    1. Cellulitis of left lower extremity (Primary)  -     doxycycline (VIBRAMYCIN) 100 MG capsule; Take 1 capsule by mouth 2 (Two) Times a Day.  Dispense: 14 capsule; Refill: 0    2. Bug bite, initial encounter    3. Essential hypertension  Comments:  Stable on current treatment plan, avoid NA in diet    4. Mild intermittent asthma without complication  Comments:  Stable with current treatment plan    5. Borderline diabetes  Comments:  Recommend daily exercise and healthy diet to prevent diabetes    Will treat for what appears to be an infection secondary to a bug bite.  Will start her on doxycycline 100 mg twice a day.  She can start topical over-the-counter steroid cream but I will have her wait since she has a history of an allergy to prednisone oral steroid.  She is to follow-up if no improvement or worsening symptoms.          Objective     Medications:  Current Outpatient Medications   Medication Instructions    albuterol (PROVENTIL) (2.5 MG/3ML) 0.083% nebulizer solution No dose, route, or frequency recorded.    albuterol sulfate  (90 Base) MCG/ACT inhaler 2 puffs, Every 4 Hours PRN    amLODIPine (NORVASC) 5 mg, Oral, Daily    ARIPiprazole (ABILIFY) 2 mg, Oral, Daily    Arnuity Ellipta 200 MCG/ACT aerosol powder  No dose, route, or frequency recorded.    ascorbic acid (VITAMIN C) 500 MG tablet Vitamin C 500 mg oral tablet take 1 tablet by oral route daily   Active    aspirin  (aspirin) 81 MG EC tablet aspirin 81 mg oral tablet,delayed release (DR/EC) take 1 tablet (81 mg) by oral route once daily   Active    betamethasone dipropionate (DIPROLENE) 0.05 % ointment 1 Application, Topical, 2 Times Daily, Apply to outer ear canals B bid x 10 days    carvedilol (COREG) 25 mg, Oral, 2 Times Daily With Meals    cetirizine (ZYRTEC) 10 mg, Oral, Daily    Cholecalciferol 50 MCG (2000 UT) capsule No dose, route, or frequency recorded.    ciprofloxacin-dexAMETHasone (Ciprodex) 0.3-0.1 % otic suspension 4 drops, Right Ear, 2 Times Daily    clotrimazole (LOTRIMIN) 1 % external solution Apply  topically to the appropriate area as directed.    Diclofenac Sodium (VOLTAREN) 1 % gel gel 4 Times Daily    diphenhydrAMINE (Benadryl Allergy) 25 mg capsule Benadryl 25 mg oral capsule take 2 capsules (50 mg) by oral route once daily at bedtime as needed   Active    docusate sodium (COLACE) 50 MG capsule 2 Times Daily PRN    doxycycline (VIBRAMYCIN) 100 mg, Oral, 2 Times Daily    DULoxetine (CYMBALTA) 60 mg, Oral, Daily    EPINEPHrine (EpiPen 2-Edwin) 0.3 MG/0.3ML solution auto-injector injection No dose, route, or frequency recorded.    fluticasone (FLONASE) 50 MCG/ACT nasal spray 2 sprays, Nasal, Daily    folic acid (FOLVITE) 800 MCG tablet folic acid 800 mcg oral tablet take 1 tablet (0.8 mg) by oral route once daily   Active    glucosamine-chondroitin 500-400 MG per tablet 1 tablet    guaiFENesin (Mucinex) 600 MG 12 hr tablet Mucinex 600 mg oral tablet extended release 12hr take 2 tablets (1,200 mg) by oral route every 12 hours as needed   Active    ibuprofen (ADVIL,MOTRIN) 800 MG tablet 1 tablet, 3 Times Daily    lamoTRIgine (LaMICtal) 25 MG tablet lamotrigine 25 mg oral tablet take 1 tablet by oral route 2 times a day   Active    montelukast (SINGULAIR) 10 MG tablet montelukast 10 mg oral tablet take 1 tablet (10 mg) by oral route once daily in the evening   Active    nystatin-triamcinolone (MYCOLOG)  "290860-1.1 UNIT/GM-% ointment 1 Application, Topical, 2 Times Daily    omeprazole (PRILOSEC) 40 mg, Oral, Daily    promethazine-dextromethorphan (PROMETHAZINE-DM) 6.25-15 MG/5ML syrup 5 mL, Oral, 4 Times Daily PRN    rosuvastatin (CRESTOR) 10 mg, Oral, Daily    Spiriva Respimat 1.25 MCG/ACT aerosol solution inhaler No dose, route, or frequency recorded.    Umeclidinium Bromide (Incruse Ellipta) 62.5 MCG/INH aerosol powder  Incruse Ellipta 62.5 mcg/actuation inhalation blister with device inhale 1 puff (62.5 mcg) by inhalation route once daily at the same time each day   Active        Vital Signs:   /71 (BP Location: Right arm, Patient Position: Sitting, Cuff Size: Large Adult)   Pulse 86   Temp 98.3 °F (36.8 °C) (Oral)   Ht 167.6 cm (65.98\")   Wt 103 kg (228 lb)   SpO2 98%   BMI 36.82 kg/m²     Class 2 Severe Obesity (BMI >=35 and <=39.9). Obesity-related health conditions include the following: hypertension and dyslipidemias. Obesity is unchanged. BMI is is above average; BMI management plan is completed. We discussed portion control and increasing exercise.       Physical Exam:  Physical Exam  Vitals and nursing note reviewed.   Constitutional:       General: She is not in acute distress.     Appearance: Normal appearance. She is not ill-appearing, toxic-appearing or diaphoretic.   HENT:      Head: Normocephalic and atraumatic.      Nose: Nose normal. No congestion or rhinorrhea.      Mouth/Throat:      Pharynx: Oropharynx is clear. No oropharyngeal exudate or posterior oropharyngeal erythema.   Eyes:      Conjunctiva/sclera: Conjunctivae normal.   Cardiovascular:      Rate and Rhythm: Normal rate.      Pulses: Normal pulses.   Pulmonary:      Effort: Pulmonary effort is normal. No respiratory distress.      Breath sounds: Normal breath sounds. No stridor. No wheezing, rhonchi or rales.   Musculoskeletal:         General: Normal range of motion.      Cervical back: Normal range of motion and neck " supple. No rigidity.   Lymphadenopathy:      Cervical: No cervical adenopathy.   Skin:     General: Skin is warm and dry.      Capillary Refill: Capillary refill takes less than 2 seconds.          Neurological:      Mental Status: She is alert and oriented to person, place, and time.   Psychiatric:         Mood and Affect: Mood normal.         Behavior: Behavior normal.           Review of Systems:  Review of Systems   Constitutional:  Negative for chills, fatigue and fever.   HENT:  Negative for congestion, ear pain, rhinorrhea, sinus pressure and sore throat.    Eyes:  Negative for blurred vision and visual disturbance.   Respiratory:  Negative for cough, shortness of breath and wheezing.    Cardiovascular:  Negative for chest pain and palpitations.   Gastrointestinal:  Negative for abdominal pain, constipation, diarrhea, nausea and GERD.   Endocrine: Negative for polyuria.   Genitourinary:  Negative for dysuria and hematuria.   Musculoskeletal:  Negative for arthralgias and myalgias.   Skin:  Positive for rash.   Neurological:  Negative for dizziness, weakness and headache.   Psychiatric/Behavioral:  Negative for depressed mood. The patient is not nervous/anxious.               Follow Up   No follow-ups on file.    Part of this note may be an electronic transcription/translation of spoken language to printed   text using the Dragon Dictation System.            Medical History:  There are no discontinued medications.   Past Medical History:    Anxiety    Asthma    EKG abnormalities    H/O degenerative disc disease    Hoarseness    Lumbago    Sinus drainage    Vitamin C deficiency     Past Surgical History:    ADENOIDECTOMY    HYSTERECTOMY    KNEE SURGERY    SINUS SURGERY    TONSILLECTOMY      Family History   Problem Relation Age of Onset    Heart disease Father     Other Father         SPINE PROBLEMS    Diabetes Father     Cancer Maternal Grandfather     Canavan disease Paternal Grandmother     Heart disease  Other         UNCLE     Social History     Tobacco Use    Smoking status: Never    Smokeless tobacco: Never   Substance Use Topics    Alcohol use: Never       Health Maintenance Due   Topic Date Due    DXA SCAN  Never done    BMI FOLLOWUP  Never done    HEPATITIS C SCREENING  Never done        Immunization History   Administered Date(s) Administered    Arexvy (RSV, Adults 60+ yrs) 01/03/2024    COVID-19 (PFIZER) 12YRS+ (COMIRNATY) 10/25/2023, 09/29/2024    COVID-19 (PFIZER) BIVALENT 12+YRS 10/12/2022    COVID-19 (PFIZER) Purple Cap Monovalent 01/22/2021, 02/12/2021, 10/31/2021, 04/26/2022    FLUAD TRI 65YR+ 09/29/2024    Flublok 18+yrs 09/19/2018    Fluzone (or Fluarix & Flulaval for VFC) >6mos 09/20/2017, 10/05/2021, 10/12/2022, 09/20/2023    Hep A, 2 Dose 06/20/2018, 12/20/2018    Hepatitis A 06/11/2018, 12/11/2018    Hepatitis B Adult/Adolescent IM 12/31/1993    Influenza Injectable Mdck Pf Quad 09/11/2019    Pneumococcal Conjugate 13-Valent (PCV13) 11/01/2016    Pneumococcal Conjugate 20-Valent (PCV20) 09/27/2023    Pneumococcal Polysaccharide (PPSV23) 01/01/2018    Shingrix 09/11/2019, 10/09/2020    influenza Split 10/14/2020       Allergies   Allergen Reactions    Fish-Derived Products Anaphylaxis     PT REPORTS SHE IS HIGHLY ALLERGIC TO ALL SHELLFISH, SEAFOOD INCLUDING TUNA, AND SHRIMP     Nuts Anaphylaxis     PT CARRIES AN EPI PEN FOR BOTH SEAFOOD AND NUT ALLERGIES     Niacin Hives    Penicillins Hives    Sulfa Antibiotics Hives    Tetanus Toxoids Hives    Keflex [Cephalexin] Unknown - Low Severity    Latex Hives    Prednisone Hives and Rash

## 2024-10-21 ENCOUNTER — TRANSCRIBE ORDERS (OUTPATIENT)
Dept: FAMILY MEDICINE CLINIC | Age: 64
End: 2024-10-21

## 2024-10-21 ENCOUNTER — OFFICE VISIT (OUTPATIENT)
Dept: FAMILY MEDICINE CLINIC | Age: 64
End: 2024-10-21
Payer: MEDICARE

## 2024-10-21 VITALS
HEIGHT: 66 IN | DIASTOLIC BLOOD PRESSURE: 79 MMHG | HEART RATE: 82 BPM | BODY MASS INDEX: 36.77 KG/M2 | OXYGEN SATURATION: 94 % | SYSTOLIC BLOOD PRESSURE: 140 MMHG | WEIGHT: 228.8 LBS

## 2024-10-21 DIAGNOSIS — R60.0 LEG EDEMA, LEFT: ICD-10-CM

## 2024-10-21 DIAGNOSIS — L03.116 CELLULITIS OF LEFT LOWER EXTREMITY: Primary | ICD-10-CM

## 2024-10-21 DIAGNOSIS — R09.89 OTHER SPECIFIED SYMPTOMS AND SIGNS INVOLVING THE CIRCULATORY AND RESPIRATORY SYSTEMS: ICD-10-CM

## 2024-10-21 PROCEDURE — 3078F DIAST BP <80 MM HG: CPT | Performed by: FAMILY MEDICINE

## 2024-10-21 PROCEDURE — 99214 OFFICE O/P EST MOD 30 MIN: CPT | Performed by: FAMILY MEDICINE

## 2024-10-21 PROCEDURE — 3077F SYST BP >= 140 MM HG: CPT | Performed by: FAMILY MEDICINE

## 2024-10-21 PROCEDURE — G2211 COMPLEX E/M VISIT ADD ON: HCPCS | Performed by: FAMILY MEDICINE

## 2024-10-21 RX ORDER — SULFAMETHOXAZOLE/TRIMETHOPRIM 800-160 MG
TABLET ORAL
COMMUNITY
Start: 2024-10-18

## 2024-10-21 NOTE — PROGRESS NOTES
Laura Villarreal presents to [facility].    Chief Complaint: Cellulitis left shin    Subjective     History of Present Illness:  Rash  Pertinent negatives include no congestion, cough, diarrhea, fatigue, fever, rhinorrhea, shortness of breath or sore throat.     Laura presents to follow-up for ongoing cellulitis of her left lower extremity/shin.  She is swelling with 1+ pitting edema in the area of the erythematous rash.  The rash is warm to touch.  She has been under treatment with doxycycline 100 mg twice daily x 10 days and then without improvement was recently added Bactrim DS twice daily x 7 days.  She has completed the doxycycline.  Over the weekend she felt like the rash did not significantly improved.  The rash looks better today but once I manipulated it becomes very red and angry.  It look like it was probably secondary to a bug bite or spider bite but I cannot find a central lesion.  She has no known history of a blood clotting disorder.  I am concerned about potentially undiagnosed peripheral arterial disease.  No known trauma.         Assessment and Plan:   Diagnoses and all orders for this visit:    1. Cellulitis of left lower extremity (Primary)  -     XR Tibia Fibula 2 View Left (In Office)  -     Vascular Screening (Peripheral Artery) CAR; Future  -     US Venous Doppler Lower Extremity Left (duplex); Future    2. Leg edema, left  -     Vascular Screening (Peripheral Artery) CAR; Future  -     US Venous Doppler Lower Extremity Left (duplex); Future    Will go ahead and further evaluate/work up for peripheral arterial disease and DVT.  Ultrasound venous Doppler lower extremity, peripheral artery lower extremity ultrasound ordered as well as an x-ray of tib-fib.  No concerning vital signs.          Objective     Medications:  Current Outpatient Medications   Medication Instructions    albuterol (PROVENTIL) (2.5 MG/3ML) 0.083% nebulizer solution No dose, route, or  frequency recorded.    albuterol sulfate  (90 Base) MCG/ACT inhaler 2 puffs, Every 4 Hours PRN    amLODIPine (NORVASC) 5 mg, Oral, Daily    ARIPiprazole (ABILIFY) 2 mg, Oral, Daily    Arnuity Ellipta 200 MCG/ACT aerosol powder  No dose, route, or frequency recorded.    ascorbic acid (VITAMIN C) 500 MG tablet Vitamin C 500 mg oral tablet take 1 tablet by oral route daily   Active    aspirin (aspirin) 81 MG EC tablet aspirin 81 mg oral tablet,delayed release (DR/EC) take 1 tablet (81 mg) by oral route once daily   Active    betamethasone dipropionate (DIPROLENE) 0.05 % ointment 1 Application, Topical, 2 Times Daily, Apply to outer ear canals B bid x 10 days    carvedilol (COREG) 25 mg, Oral, 2 Times Daily With Meals    cetirizine (ZYRTEC) 10 mg, Oral, Daily    Cholecalciferol 50 MCG (2000 UT) capsule No dose, route, or frequency recorded.    ciprofloxacin-dexAMETHasone (Ciprodex) 0.3-0.1 % otic suspension 4 drops, Right Ear, 2 Times Daily    clotrimazole (LOTRIMIN) 1 % external solution Apply  topically to the appropriate area as directed.    Diclofenac Sodium (VOLTAREN) 1 % gel gel 4 Times Daily    diphenhydrAMINE (Benadryl Allergy) 25 mg capsule Benadryl 25 mg oral capsule take 2 capsules (50 mg) by oral route once daily at bedtime as needed   Active    docusate sodium (COLACE) 50 MG capsule 2 Times Daily PRN    doxycycline (VIBRAMYCIN) 100 mg, Oral, 2 Times Daily    DULoxetine (CYMBALTA) 60 mg, Oral, Daily    EPINEPHrine (EpiPen 2-Edwin) 0.3 MG/0.3ML solution auto-injector injection No dose, route, or frequency recorded.    fluticasone (FLONASE) 50 MCG/ACT nasal spray 2 sprays, Nasal, Daily    folic acid (FOLVITE) 800 MCG tablet folic acid 800 mcg oral tablet take 1 tablet (0.8 mg) by oral route once daily   Active    glucosamine-chondroitin 500-400 MG per tablet 1 tablet    guaiFENesin (Mucinex) 600 MG 12 hr tablet Mucinex 600 mg oral tablet extended release 12hr take 2 tablets (1,200 mg) by oral route every  "12 hours as needed   Active    ibuprofen (ADVIL,MOTRIN) 800 MG tablet 1 tablet, 3 Times Daily    lamoTRIgine (LaMICtal) 25 MG tablet lamotrigine 25 mg oral tablet take 1 tablet by oral route 2 times a day   Active    montelukast (SINGULAIR) 10 MG tablet montelukast 10 mg oral tablet take 1 tablet (10 mg) by oral route once daily in the evening   Active    nystatin-triamcinolone (MYCOLOG) 493394-0.1 UNIT/GM-% ointment 1 Application, Topical, 2 Times Daily    omeprazole (PRILOSEC) 40 mg, Oral, Daily    promethazine-dextromethorphan (PROMETHAZINE-DM) 6.25-15 MG/5ML syrup 5 mL, Oral, 4 Times Daily PRN    rosuvastatin (CRESTOR) 10 mg, Oral, Daily    Spiriva Respimat 1.25 MCG/ACT aerosol solution inhaler No dose, route, or frequency recorded.    sulfamethoxazole-trimethoprim (BACTRIM DS,SEPTRA DS) 800-160 MG per tablet     Umeclidinium Bromide (Incruse Ellipta) 62.5 MCG/INH aerosol powder  Incruse Ellipta 62.5 mcg/actuation inhalation blister with device inhale 1 puff (62.5 mcg) by inhalation route once daily at the same time each day   Active        Vital Signs:   /79 (BP Location: Left arm, Patient Position: Sitting, Cuff Size: Large Adult)   Pulse 82   Ht 167.6 cm (65.98\")   Wt 104 kg (228 lb 12.8 oz)   SpO2 94%   BMI 36.95 kg/m²     Class 2 Severe Obesity (BMI >=35 and <=39.9). Obesity-related health conditions include the following: hypertension and dyslipidemias. Obesity is unchanged. BMI is is above average; BMI management plan is completed. We discussed portion control and increasing exercise.       Physical Exam:  Physical Exam  Vitals and nursing note reviewed.   Constitutional:       General: She is not in acute distress.     Appearance: Normal appearance. She is not ill-appearing, toxic-appearing or diaphoretic.   HENT:      Head: Normocephalic and atraumatic.      Nose: Nose normal. No congestion or rhinorrhea.      Mouth/Throat:      Pharynx: Oropharynx is clear. No oropharyngeal exudate or " posterior oropharyngeal erythema.   Eyes:      Conjunctiva/sclera: Conjunctivae normal.   Cardiovascular:      Rate and Rhythm: Normal rate.      Pulses: Normal pulses.   Pulmonary:      Effort: Pulmonary effort is normal. No respiratory distress.      Breath sounds: Normal breath sounds. No stridor. No wheezing, rhonchi or rales.   Musculoskeletal:         General: Normal range of motion.      Cervical back: Normal range of motion and neck supple. No rigidity.   Lymphadenopathy:      Cervical: No cervical adenopathy.   Skin:     General: Skin is warm and dry.      Capillary Refill: Capillary refill takes less than 2 seconds.          Neurological:      Mental Status: She is alert and oriented to person, place, and time.   Psychiatric:         Mood and Affect: Mood normal.         Behavior: Behavior normal.           Review of Systems:  Review of Systems   Constitutional:  Negative for chills, fatigue and fever.   HENT:  Negative for congestion, ear pain, rhinorrhea, sinus pressure and sore throat.    Eyes:  Negative for blurred vision and visual disturbance.   Respiratory:  Negative for cough, shortness of breath and wheezing.    Cardiovascular:  Negative for chest pain and palpitations.   Gastrointestinal:  Negative for abdominal pain, constipation, diarrhea, nausea and GERD.   Endocrine: Negative for polyuria.   Genitourinary:  Negative for dysuria and hematuria.   Musculoskeletal:  Negative for arthralgias and myalgias.   Skin:  Positive for rash.   Neurological:  Negative for dizziness, weakness and headache.   Psychiatric/Behavioral:  Negative for depressed mood. The patient is not nervous/anxious.               Follow Up   No follow-ups on file.    Part of this note may be an electronic transcription/translation of spoken language to printed   text using the Dragon Dictation System.            Medical History:  There are no discontinued medications.   Past Medical History:    Anxiety    Asthma    EKG  abnormalities    H/O degenerative disc disease    Hoarseness    Lumbago    Sinus drainage    Vitamin C deficiency     Past Surgical History:    ADENOIDECTOMY    HYSTERECTOMY    KNEE SURGERY    SINUS SURGERY    TONSILLECTOMY      Family History   Problem Relation Age of Onset    Heart disease Father     Other Father         SPINE PROBLEMS    Diabetes Father     Cancer Maternal Grandfather     Canavan disease Paternal Grandmother     Heart disease Other         UNCLE     Social History     Tobacco Use    Smoking status: Never    Smokeless tobacco: Never   Substance Use Topics    Alcohol use: Never       Health Maintenance Due   Topic Date Due    DXA SCAN  Never done    HEPATITIS C SCREENING  Never done        Immunization History   Administered Date(s) Administered    Arexvy (RSV, Adults 60+ yrs) 01/03/2024    COVID-19 (PFIZER) 12YRS+ (COMIRNATY) 10/25/2023, 09/29/2024    COVID-19 (PFIZER) BIVALENT 12+YRS 10/12/2022    COVID-19 (PFIZER) Purple Cap Monovalent 01/22/2021, 02/12/2021, 10/31/2021, 04/26/2022    FLUAD TRI 65YR+ 09/29/2024    Flublok 18+yrs 09/19/2018    Fluzone (or Fluarix & Flulaval for VFC) >6mos 09/20/2017, 10/05/2021, 10/12/2022, 09/20/2023    Hep A, 2 Dose 06/20/2018, 12/20/2018    Hepatitis A 06/11/2018, 12/11/2018    Hepatitis B Adult/Adolescent IM 12/31/1993    Influenza Injectable Mdck Pf Quad 09/11/2019    Pneumococcal Conjugate 13-Valent (PCV13) 11/01/2016    Pneumococcal Conjugate 20-Valent (PCV20) 09/27/2023    Pneumococcal Polysaccharide (PPSV23) 01/01/2018    Shingrix 09/11/2019, 10/09/2020    influenza Split 10/14/2020       Allergies   Allergen Reactions    Fish-Derived Products Anaphylaxis     PT REPORTS SHE IS HIGHLY ALLERGIC TO ALL SHELLFISH, SEAFOOD INCLUDING TUNA, AND SHRIMP     Nuts Anaphylaxis     PT CARRIES AN EPI PEN FOR BOTH SEAFOOD AND NUT ALLERGIES     Niacin Hives    Penicillins Hives    Sulfa Antibiotics Hives    Tetanus Toxoids Hives    Keflex [Cephalexin] Unknown - Low  Severity    Latex Hives    Prednisone Hives and Rash

## 2024-10-22 ENCOUNTER — HOSPITAL ENCOUNTER (OUTPATIENT)
Dept: CARDIOLOGY | Facility: HOSPITAL | Age: 64
Discharge: HOME | End: 2024-10-22
Admitting: FAMILY MEDICINE
Payer: MEDICARE

## 2024-10-22 DIAGNOSIS — L03.116 CELLULITIS OF LEFT LOWER EXTREMITY: ICD-10-CM

## 2024-10-22 DIAGNOSIS — R60.0 LEG EDEMA, LEFT: ICD-10-CM

## 2024-10-22 DIAGNOSIS — R09.89 OTHER SPECIFIED SYMPTOMS AND SIGNS INVOLVING THE CIRCULATORY AND RESPIRATORY SYSTEMS: ICD-10-CM

## 2024-10-22 LAB
BH CV LOWER ARTERIAL LEFT ABI RATIO: 1.2
BH CV LOWER ARTERIAL LEFT DORSALIS PEDIS SYS MAX: 166
BH CV LOWER ARTERIAL LEFT GREAT TOE SYS MAX: 131
BH CV LOWER ARTERIAL LEFT POST TIBIAL SYS MAX: 166
BH CV LOWER ARTERIAL LEFT TBI RATIO: 0.98
BH CV LOWER ARTERIAL RIGHT ABI RATIO: 1.2
BH CV LOWER ARTERIAL RIGHT DORSALIS PEDIS SYS MAX: 161
BH CV LOWER ARTERIAL RIGHT GREAT TOE SYS MAX: 146
BH CV LOWER ARTERIAL RIGHT POST TIBIAL SYS MAX: 162
BH CV LOWER ARTERIAL RIGHT TBI RATIO: 1.09
UPPER ARTERIAL LEFT ARM BRACHIAL SYS MAX: 127
UPPER ARTERIAL RIGHT ARM BRACHIAL SYS MAX: 134

## 2024-10-22 PROCEDURE — 93923 UPR/LXTR ART STDY 3+ LVLS: CPT

## 2024-10-22 PROCEDURE — 93922 UPR/L XTREMITY ART 2 LEVELS: CPT | Performed by: SURGERY

## 2024-10-22 PROCEDURE — 93922 UPR/L XTREMITY ART 2 LEVELS: CPT

## 2024-10-23 ENCOUNTER — NURSING HOME (OUTPATIENT)
Dept: FAMILY MEDICINE CLINIC | Age: 64
End: 2024-10-23
Payer: MEDICARE

## 2024-10-23 VITALS
HEART RATE: 81 BPM | DIASTOLIC BLOOD PRESSURE: 75 MMHG | TEMPERATURE: 96.8 F | SYSTOLIC BLOOD PRESSURE: 142 MMHG | RESPIRATION RATE: 18 BRPM | OXYGEN SATURATION: 95 %

## 2024-10-23 DIAGNOSIS — L03.116 CELLULITIS OF LEFT LOWER EXTREMITY: Primary | ICD-10-CM

## 2024-10-23 PROCEDURE — 99347 HOME/RES VST EST SF MDM 20: CPT | Performed by: FAMILY MEDICINE

## 2024-10-23 RX ORDER — SULFAMETHOXAZOLE/TRIMETHOPRIM 800-160 MG
1 TABLET ORAL 2 TIMES DAILY
Qty: 6 TABLET | Refills: 0 | Status: SHIPPED | OUTPATIENT
Start: 2024-10-23

## 2024-10-23 NOTE — PROGRESS NOTES
Laura Villarreal presents for an evaluation physician visit at the [facility] [place]    Chief Complaint  f/u rash      Subjective      History of Present Illness      Laura presents to follow-up with her rash on her left lower extremity with swelling and tenderness.  It is over her mid left shin.  I saw her earlier this week because is getting worse.  She was switched over to Bactrim DS and now her rash is finally starting to improve.  There were concerns about arterial blood flow and blood supply to the skin and the study was done yesterday that showed she had normal blood flow.   Her pain is improving and she is not running a fever.  Areas no longer hot to touchNo radiology results for the last 30 days.   She had an arterial and venous Doppler study done at [facility] yesterday and it showed normal blood flow and no DVT          Objective   Vital Signs:   /75   Pulse 81   Temp 96.8 °F (36 °C)   Resp 18   SpO2 95%     Physical Exam  Vitals reviewed.   Constitutional:       General: She is not in acute distress.     Appearance: Normal appearance. She is normal weight. She is not ill-appearing.   HENT:      Head: Normocephalic and atraumatic.   Eyes:      Extraocular Movements: Extraocular movements intact.      Pupils: Pupils are equal, round, and reactive to light.   Pulmonary:      Effort: Pulmonary effort is normal.   Neurological:      General: No focal deficit present.      Mental Status: She is alert and oriented to person, place, and time.   Psychiatric:         Mood and Affect: Mood normal.         Behavior: Behavior normal.         Thought Content: Thought content normal.         Judgment: Judgment normal.                  Assessment and Plan   Diagnoses and all orders for this visit:    1. Cellulitis of left lower extremity (Primary)  Comments:  Resolving on Bactrim.  Stop doxycycline and will extend Bactrim treatment to 10 days total.  She is to follow-up if no proving or worsening  "symptoms    Other orders  -     sulfamethoxazole-trimethoprim (Bactrim DS) 800-160 MG per tablet; Take 1 tablet by mouth 2 (Two) Times a Day.  Dispense: 6 tablet; Refill: 0        Follow Up   No follow-ups on file.    Review of Systems   Constitutional:  Negative for chills, fatigue and fever.   HENT:  Negative for ear pain, sinus pressure and sore throat.    Eyes:  Negative for blurred vision and double vision.   Respiratory:  Negative for cough, shortness of breath and wheezing.    Cardiovascular:  Negative for chest pain and palpitations.   Gastrointestinal:  Negative for abdominal pain, blood in stool, constipation, diarrhea, nausea and vomiting.   Musculoskeletal:  Negative for arthralgias and back pain.   Skin:  Positive for rash.   Neurological:  Negative for dizziness and headache.   Psychiatric/Behavioral:  Negative for sleep disturbance, suicidal ideas and depressed mood. The patient is not nervous/anxious.         Result Review   The following data was reviewed by Corinne Randolph MD on 10/23/2024.  No results found for: \"WBC\", \"HGB\", \"HCT\", \"MCV\", \"PLT\"  No results found for: \"GLUCOSE\", \"BUN\", \"CREATININE\", \"NA\", \"K\", \"CL\", \"CALCIUM\", \"PROTEINTOT\", \"ALBUMIN\", \"ALT\", \"AST\", \"ALKPHOS\", \"BILITOT\", \"GLOB\", \"AGRATIO\", \"BCR\", \"ANIONGAP\", \"EGFR\"  No results found for: \"CHOL\", \"CHLPL\", \"TRIG\", \"HDL\", \"LDL\", \"LDLDIRECT\"  No results found for: \"TSH\"  No results found for: \"HGBA1C\"  No results found for: \"PSA\"    Part of this note may be an electronic transcription/translation of spoken language to printed   text using the Dragon Dictation System.      "

## 2024-10-23 NOTE — PROGRESS NOTES
Laura Villarreal presents for an evaluation physician visit at the [facility] [place]    Chief Complaint  f/u rash      Subjective      History of Present Illness      Laura presents to follow-up with her rash on her left lower extremity with swelling and tenderness.  It is over her mid left shin.  I saw her earlier this week because is getting worse.  She was switched over to Bactrim DS and now her rash is finally starting to improve.  There were concerns about arterial blood flow and blood supply to the skin and the study was done yesterday that showed she had normal blood flow.   Her pain is improving and she is not running a fever.  Areas no longer hot to touch    No radiology results for the last 30 days.   She had an arterial and venous Doppler study done at [facility] yesterday and it showed normal blood flow and no DVT          Objective   Vital Signs:   There were no vitals taken for this visit.    Physical Exam  Vitals reviewed.   Constitutional:       General: She is not in acute distress.     Appearance: Normal appearance. She is normal weight. She is not ill-appearing.   HENT:      Head: Normocephalic and atraumatic.   Eyes:      Extraocular Movements: Extraocular movements intact.      Pupils: Pupils are equal, round, and reactive to light.   Pulmonary:      Effort: Pulmonary effort is normal.   Skin:         Neurological:      General: No focal deficit present.      Mental Status: She is alert and oriented to person, place, and time.   Psychiatric:         Mood and Affect: Mood normal.         Behavior: Behavior normal.         Thought Content: Thought content normal.         Judgment: Judgment normal.                  Assessment and Plan   Diagnoses and all orders for this visit:    1. Cellulitis of left lower extremity (Primary)  Comments:  Meetings and arteries are clear without blockage.  Cellulitis is finally clearing on Bactrim.  Will extend for 10-day total treatment          Follow Up    Return if symptoms worsen or fail to improve.    Review of Systems   Constitutional:  Negative for chills, fatigue and fever.   HENT:  Negative for ear pain, sinus pressure and sore throat.    Eyes:  Negative for blurred vision and double vision.   Respiratory:  Negative for cough, shortness of breath and wheezing.    Cardiovascular:  Negative for chest pain and palpitations.   Gastrointestinal:  Negative for abdominal pain, blood in stool, constipation, diarrhea, nausea and vomiting.   Musculoskeletal:  Negative for arthralgias and back pain.   Skin:  Positive for rash.   Neurological:  Negative for dizziness and headache.   Psychiatric/Behavioral:  Negative for sleep disturbance, suicidal ideas and depressed mood. The patient is not nervous/anxious.         Result Review   The following data was reviewed by Corinne Randolph MD on 10/23/2024.      Part of this note may be an electronic transcription/translation of spoken language to printed   text using the Dragon Dictation System.

## 2024-10-24 ENCOUNTER — EXTERNAL PBMM DATA (OUTPATIENT)
Dept: PHARMACY | Facility: OTHER | Age: 64
End: 2024-10-24
Payer: MEDICARE

## 2024-11-20 ENCOUNTER — EXTERNAL PBMM DATA (OUTPATIENT)
Dept: PHARMACY | Facility: OTHER | Age: 64
End: 2024-11-20
Payer: MEDICARE

## 2024-11-22 ENCOUNTER — POP HEALTH PHARMACY (OUTPATIENT)
Dept: PHARMACY | Facility: OTHER | Age: 64
End: 2024-11-22
Payer: MEDICARE

## 2024-12-16 ENCOUNTER — POP HEALTH PHARMACY (OUTPATIENT)
Dept: PHARMACY | Facility: OTHER | Age: 64
End: 2024-12-16
Payer: MEDICARE

## 2025-01-20 RX ORDER — FLUTICASONE FUROATE 200 UG/1
POWDER RESPIRATORY (INHALATION)
Qty: 30 EACH | Refills: 3 | OUTPATIENT
Start: 2025-01-20

## 2025-01-31 ENCOUNTER — NURSING HOME (OUTPATIENT)
Dept: FAMILY MEDICINE CLINIC | Age: 65
End: 2025-01-31
Payer: MEDICARE

## 2025-01-31 VITALS
OXYGEN SATURATION: 96 % | TEMPERATURE: 98.1 F | WEIGHT: 222 LBS | HEART RATE: 82 BPM | BODY MASS INDEX: 35.68 KG/M2 | SYSTOLIC BLOOD PRESSURE: 137 MMHG | RESPIRATION RATE: 16 BRPM | HEIGHT: 66 IN | DIASTOLIC BLOOD PRESSURE: 70 MMHG

## 2025-01-31 DIAGNOSIS — E66.01 CLASS 2 SEVERE OBESITY DUE TO EXCESS CALORIES WITH SERIOUS COMORBIDITY AND BODY MASS INDEX (BMI) OF 37.0 TO 37.9 IN ADULT: ICD-10-CM

## 2025-01-31 DIAGNOSIS — J30.9 ALLERGIC RHINITIS, UNSPECIFIED SEASONALITY, UNSPECIFIED TRIGGER: ICD-10-CM

## 2025-01-31 DIAGNOSIS — F32.5 MAJOR DEPRESSIVE DISORDER WITH SINGLE EPISODE, IN FULL REMISSION: ICD-10-CM

## 2025-01-31 DIAGNOSIS — E11.43 TYPE 2 DIABETES MELLITUS WITH DIABETIC AUTONOMIC NEUROPATHY, WITHOUT LONG-TERM CURRENT USE OF INSULIN: Primary | ICD-10-CM

## 2025-01-31 DIAGNOSIS — I10 ESSENTIAL HYPERTENSION: ICD-10-CM

## 2025-01-31 DIAGNOSIS — J45.20 MILD INTERMITTENT ASTHMA WITHOUT COMPLICATION: ICD-10-CM

## 2025-01-31 DIAGNOSIS — E66.812 CLASS 2 SEVERE OBESITY DUE TO EXCESS CALORIES WITH SERIOUS COMORBIDITY AND BODY MASS INDEX (BMI) OF 37.0 TO 37.9 IN ADULT: ICD-10-CM

## 2025-01-31 PROCEDURE — 99348 HOME/RES VST EST LOW MDM 30: CPT | Performed by: FAMILY MEDICINE

## 2025-01-31 RX ORDER — FLUTICASONE FUROATE 200 UG/1
200 POWDER RESPIRATORY (INHALATION)
Qty: 30 EACH | Refills: 5 | Status: SHIPPED | OUTPATIENT
Start: 2025-01-31

## 2025-01-31 NOTE — PROGRESS NOTES
Laura Villarreal presents for an evaluation physician visit at the [facility] [place]    Chief Complaint:  diabetes new dx      Subjective      History of Present Illness    Laura presents with new dx of diabetes, hgba1c up from 6.2 to 6.9 %.  She has lost 10#s since her labs were drawn.  She is on OTC glucoseBIO and taking 1/4 cup lemon juice with 3 cups water, bay leaves and raisons. She c/o PN pain, has sign foot deformity.  Eye exam is UTD-approx 2 months ago, has new glasses, h/o cataract surgery.  She is not checking BS at this time    She has 2 sores that are new in her scalp area-has derm appt next week, she has a FH melenoma in her mother.    She is on anuity and spiriva for her asthma, states her my chart says this med was discontinuedd but it was not and I refilled today.  She is on allergy shots she is also currently on cetirizine 10 mg daily.  She has chronic runny nose.  No fever/N/V/diarrhea     Valeries rash in her LE and ear have resolved      She has hypertension, on carvedilol 12.5 mg twice daily and amlodipine 5 mg twice daily, she tolerates meds well, she avoid sodium in diet.  She is working on healthy diet and weight loss and lost 10 pounds.  Blood pressure today is good at 137/70     She has sleep apnea, uses cpap a and her readings on her CPAP machine are normal and she is doing well.      She presents with chronic B knee pain in both knees, she has been to ortho, told she is bone on bone and needs B knee replacements, she is on tumeric, wants to know if collagen is helpful     Her chronic GERD is stable on omeprazole 40 mg daily, she knows she is avoid spicy and acidic food in her diet     Her depression remains stable and well-controlled with Cymbalta 60 mg daily/Abilify 5 mg daily, she is stable, sleeping well, no SI/HI     She presents with hypercholesterolemia, current treatment is rosuvastatin 10 mg nightly.  She tolerates medication well.    Current meds: Amlodipine 5 mg  "twice daily, Abilify 2 mg daily, newly 200 mcg 1 puff nightly, calcium with magnesium and zinc and D3, carvedilol 12.5 mg twice daily, Zyrtec 10 mg daily, diclofenac 1% topical gel,  mg twice a week, Cymbalta 60 mg daily, Flonase nasal spray, folic acid 800 mcg daily, glucosamine 2 Gummies daily, Singulair 10 mg nightly, omeprazole 40 mg daily, probiotic Gummies, rosuvastatin 10 mg daily, Spiriva 1.2 mcg 2 puffs nightly, turmeric 50 mg twice daily, vitamin C 500 mg daily    Labs: 1/15/25: WBC 6.7, hemoglobin 12.9, hematocrit 40.7, platelets 276, glucose 131, creatinine 0.69, GFR 96, sodium 142, potassium 4.1, calcium 9.1, protein 7.1, bilirubin 0.3, alkaline phosphatase 154, AST 24, ALT 20, cholesterol 148, triglycerides 126, HDL 45, LDL 81, hemoglobin A1c 6.9%, TSH 1.88%    Objective   Vital Signs:   /70   Pulse 82   Temp 98.1 °F (36.7 °C)   Resp 16   Ht 167.6 cm (66\")   Wt 101 kg (222 lb)   SpO2 96%   BMI 35.83 kg/m²     Physical Exam  Vitals and nursing note reviewed.   Constitutional:       General: She is not in acute distress.     Appearance: Normal appearance. She is normal weight. She is not ill-appearing, toxic-appearing or diaphoretic.   HENT:      Head: Normocephalic and atraumatic.      Right Ear: Tympanic membrane, ear canal and external ear normal.      Left Ear: Tympanic membrane, ear canal and external ear normal.      Nose: No congestion or rhinorrhea.      Mouth/Throat:      Mouth: Mucous membranes are moist.      Pharynx: Oropharynx is clear. No oropharyngeal exudate or posterior oropharyngeal erythema.   Eyes:      Extraocular Movements: Extraocular movements intact.      Conjunctiva/sclera: Conjunctivae normal.      Pupils: Pupils are equal, round, and reactive to light.   Cardiovascular:      Rate and Rhythm: Normal rate and regular rhythm.      Pulses:           Dorsalis pedis pulses are 2+ on the right side and 2+ on the left side.      Heart sounds: Normal heart sounds. "   Pulmonary:      Effort: Pulmonary effort is normal.      Breath sounds: Normal breath sounds. No wheezing, rhonchi or rales.   Abdominal:      General: Abdomen is flat.      Palpations: Abdomen is soft. There is no mass.      Tenderness: There is no abdominal tenderness.      Hernia: No hernia is present.   Musculoskeletal:      Cervical back: Neck supple. No rigidity.      Right lower leg: No edema.      Left lower leg: No edema.      Right foot: Deformity present.      Left foot: Deformity present.   Feet:      Right foot:      Protective Sensation: 7 sites tested.  7 sites sensed.      Skin integrity: Skin integrity normal. No ulcer or blister.      Toenail Condition: Right toenails are normal.      Left foot:      Protective Sensation: 7 sites tested.  7 sites sensed.      Skin integrity: Skin integrity normal. No ulcer or blister.      Toenail Condition: Left toenails are normal.      Comments: Diabetic Foot Exam Performed and Monofilament Test Performed     Deformity with lateral deviation great toe and 3rd toe B and abnormal growth on 1st prox metacarpal B  Lymphadenopathy:      Cervical: No cervical adenopathy.   Skin:     General: Skin is warm and dry.          Neurological:      General: No focal deficit present.      Mental Status: She is alert and oriented to person, place, and time. Mental status is at baseline.   Psychiatric:         Mood and Affect: Mood normal.         Behavior: Behavior normal.         Thought Content: Thought content normal.         Judgment: Judgment normal.                  Assessment and Plan   Diagnoses and all orders for this visit:    1. Type 2 diabetes mellitus with diabetic autonomic neuropathy, without long-term current use of insulin (Primary)  Comments:  New diagnosis.  She has lost 10 pounds on her own last month, will continue healthy low-carb diet and reeval in April, eye exam UTD    2. Essential hypertension  Comments:  BP is stable and well-controlled.  No changes  in current meds or treatment plan.  She is avoid NA in diet    3. Mild intermittent asthma without complication  Comments:  Will refill Arnuity and continue Spiriva.  Continue allergy medication including Zyrtec and Singulair.  She tolerates meds well    4. Class 2 severe obesity due to excess calories with serious comorbidity and body mass index (BMI) of 37.0 to 37.9 in adult  Comments:  Continue work on healthy low-carb diet, daily exercise for weight loss    5. Major depressive disorder with single episode, in full remission  Comments:  No acute issues.  She is stable and well-controlled on Cymbalta and tolerates it well.  Sleeping okay at night.  Denies sadness or anxiety    6. Allergic rhinitis, unspecified seasonality, unspecified trigger  Comments:  Overall stable on Zyrtec and Singulair.  No changes in current meds or treatment plan    Other orders  -     Arnuity Ellipta 200 MCG/ACT; Inhale 1 Act every night at bedtime.  Dispense: 30 each; Refill: 5            Follow Up   Return in about 3 months (around 4/30/2025) for Recheck.    Review of Systems   Constitutional:  Negative for chills, fatigue and fever.   HENT:  Negative for ear pain, sinus pressure and sore throat.    Eyes:  Negative for blurred vision and double vision.   Respiratory:  Negative for cough, shortness of breath and wheezing.    Cardiovascular:  Negative for chest pain and palpitations.   Gastrointestinal:  Negative for abdominal pain, blood in stool, constipation, diarrhea, nausea and vomiting.   Musculoskeletal:  Negative for arthralgias and back pain.   Skin:  Positive for rash.   Neurological:  Negative for dizziness and headache.   Psychiatric/Behavioral:  Negative for sleep disturbance, suicidal ideas and depressed mood. The patient is not nervous/anxious.         Result Review   The following data was reviewed by Corinne Randolph MD on 10/23/2024.      Part of this note may be an electronic transcription/translation of spoken  language to printed   text using the Dragon Dictation System.

## 2025-03-20 ENCOUNTER — NURSING HOME (OUTPATIENT)
Dept: FAMILY MEDICINE CLINIC | Age: 65
End: 2025-03-20
Payer: MEDICARE

## 2025-03-20 VITALS
BODY MASS INDEX: 35.83 KG/M2 | DIASTOLIC BLOOD PRESSURE: 61 MMHG | TEMPERATURE: 97.7 F | OXYGEN SATURATION: 95 % | HEART RATE: 88 BPM | WEIGHT: 222 LBS | RESPIRATION RATE: 18 BRPM | SYSTOLIC BLOOD PRESSURE: 127 MMHG

## 2025-03-20 DIAGNOSIS — K59.00 CONSTIPATION, UNSPECIFIED CONSTIPATION TYPE: ICD-10-CM

## 2025-03-20 DIAGNOSIS — R73.03 BORDERLINE DIABETES: ICD-10-CM

## 2025-03-20 DIAGNOSIS — I10 ESSENTIAL HYPERTENSION: Primary | ICD-10-CM

## 2025-03-20 DIAGNOSIS — R00.0: ICD-10-CM

## 2025-03-20 RX ORDER — CARVEDILOL 25 MG/1
25 TABLET ORAL 2 TIMES DAILY WITH MEALS
Qty: 60 TABLET | Refills: 5 | Status: SHIPPED | OUTPATIENT
Start: 2025-03-20

## 2025-03-20 NOTE — PROGRESS NOTES
Laura Villarreal presents for an evaluation physician visit at the [facility] [place]    Chief Complaint:  increased pulse      Subjective      History of Present Illness    I am seeing Laura today for elevated pulse, running  past week, she feels fine, no heart palpitations, on coreg 25 mg bid and tolerates med well. Denies SOA/LE edema/CP    In addition she presents with type 2 diabetes. hgba1c up from 6.2 to 6.9 %.  She has lost 11#s in past 3 months.  Recommend yearly eye exams.  No acute issues with her feet.    She presents with chronic asthma and is overall stable on Arnuity and Spiriva.   She also has chronic allergies and is currently stable on allergy shots and cetirizine 10 mg daily.  She has chronic runny nose.  No fever/N/V/diarrhea      She presents with chronic hypertension, on carvedilol 25 mg twice daily and amlodipine 5 mg twice daily, she tolerates meds well, she avoid sodium in diet.  She is working on healthy diet and weight loss.     She has sleep apnea, uses cpap a and her readings on her CPAP machine are normal.    She presents with chronic GERD is stable on omeprazole 40 mg daily, she knows she is avoid spicy and acidic food in her diet     She presents with depression remains stable and well-controlled with Cymbalta 60 mg daily/Abilify 5 mg daily, she is stable, sleeping well, no SI/HI     She presents with hypercholesterolemia, current treatment is rosuvastatin 10 mg nightly.  She tolerates medication well.    Current meds: Amlodipine 5 mg twice daily, Abilify 2 mg daily, newly 200 mcg 1 puff nightly, calcium with magnesium and zinc and D3, carvedilol 12.5 mg twice daily, Zyrtec 10 mg daily, diclofenac 1% topical gel,  mg twice a week, Cymbalta 60 mg daily, Flonase nasal spray, folic acid 800 mcg daily, glucosamine 2 Gummies daily, Singulair 10 mg nightly, omeprazole 40 mg daily, probiotic Gummies, rosuvastatin 10 mg daily, Spiriva 1.2 mcg 2 puffs nightly, turmeric  50 mg twice daily, vitamin C 500 mg daily    Labs: 1/15/25: WBC 6.7, hemoglobin 12.9, hematocrit 40.7, platelets 276, glucose 131, creatinine 0.69, GFR 96, sodium 142, potassium 4.1, calcium 9.1, protein 7.1, bilirubin 0.3, alkaline phosphatase 154, AST 24, ALT 20, cholesterol 148, triglycerides 126, HDL 45, LDL 81, hemoglobin A1c 6.9%, TSH 1.88%              Objective   Vital Signs:   /61   Pulse 88   Temp 97.7 °F (36.5 °C)   Resp 18   Wt 101 kg (222 lb)   SpO2 95%   BMI 35.83 kg/m²     Physical Exam  Vitals and nursing note reviewed.   Constitutional:       General: She is not in acute distress.     Appearance: Normal appearance. She is not ill-appearing, toxic-appearing or diaphoretic.   HENT:      Head: Normocephalic and atraumatic.      Nose: No congestion or rhinorrhea.      Mouth/Throat:      Mouth: Mucous membranes are moist.      Pharynx: Oropharynx is clear. No oropharyngeal exudate or posterior oropharyngeal erythema.   Eyes:      Extraocular Movements: Extraocular movements intact.      Conjunctiva/sclera: Conjunctivae normal.      Pupils: Pupils are equal, round, and reactive to light.   Cardiovascular:      Rate and Rhythm: Normal rate and regular rhythm.      Heart sounds: Normal heart sounds.   Pulmonary:      Effort: Pulmonary effort is normal.      Breath sounds: Normal breath sounds. No wheezing, rhonchi or rales.   Abdominal:      General: Abdomen is flat.      Palpations: Abdomen is soft. There is no mass.      Tenderness: There is no abdominal tenderness.      Hernia: No hernia is present.   Musculoskeletal:      Cervical back: Neck supple. No rigidity.      Right lower leg: No edema.      Left lower leg: No edema.   Lymphadenopathy:      Cervical: No cervical adenopathy.   Skin:     General: Skin is warm and dry.   Neurological:      General: No focal deficit present.      Mental Status: She is alert and oriented to person, place, and time. Mental status is at baseline.  "  Psychiatric:         Mood and Affect: Mood normal.         Behavior: Behavior normal.         Thought Content: Thought content normal.         Judgment: Judgment normal.                  Assessment and Plan   Assessment & Plan  Essential hypertension      Orders:    carvedilol (Coreg) 25 MG tablet; Take 1 tablet by mouth 2 (Two) Times a Day With Meals.    Borderline fast pulse         Constipation, unspecified constipation type         Borderline diabetes              Follow Up   No follow-ups on file.    Review of Systems   Constitutional:  Negative for chills, fatigue and fever.   HENT:  Negative for ear pain, sinus pressure and sore throat.    Eyes:  Negative for blurred vision and double vision.   Respiratory:  Negative for cough, shortness of breath and wheezing.    Cardiovascular:  Negative for chest pain, palpitations and leg swelling.   Gastrointestinal:  Negative for abdominal pain, blood in stool, constipation, diarrhea, nausea and vomiting.   Skin:  Negative for rash.   Neurological:  Negative for dizziness and headache.   Psychiatric/Behavioral:  Negative for sleep disturbance, suicidal ideas and depressed mood. The patient is not nervous/anxious.         Result Review   The following data was reviewed by Corinne Randolph MD on 03/20/2025.  No results found for: \"WBC\", \"HGB\", \"HCT\", \"MCV\", \"PLT\"  No results found for: \"GLUCOSE\", \"BUN\", \"CREATININE\", \"NA\", \"K\", \"CL\", \"CALCIUM\", \"PROTEINTOT\", \"ALBUMIN\", \"ALT\", \"AST\", \"ALKPHOS\", \"BILITOT\", \"GLOB\", \"AGRATIO\", \"BCR\", \"ANIONGAP\", \"EGFR\"  No results found for: \"CHOL\", \"CHLPL\", \"TRIG\", \"HDL\", \"LDL\", \"LDLDIRECT\"  No results found for: \"TSH\"  No results found for: \"HGBA1C\"  No results found for: \"PSA\"    Part of this note may be an electronic transcription/translation of spoken language to printed   text using the Dragon Dictation System.      "

## 2025-03-20 NOTE — ASSESSMENT & PLAN NOTE
Orders:    carvedilol (Coreg) 25 MG tablet; Take 1 tablet by mouth 2 (Two) Times a Day With Meals.

## 2025-04-15 ENCOUNTER — TELEPHONE (OUTPATIENT)
Dept: FAMILY MEDICINE CLINIC | Age: 65
End: 2025-04-15
Payer: MEDICARE

## 2025-04-15 NOTE — TELEPHONE ENCOUNTER
Fall on Sunday requesting a bilateral knee x-ray they are very sore and bruised they can do it there thru [facility]

## 2025-04-17 ENCOUNTER — NURSING HOME (OUTPATIENT)
Dept: FAMILY MEDICINE CLINIC | Age: 65
End: 2025-04-17
Payer: MEDICARE

## 2025-04-17 VITALS
TEMPERATURE: 97.9 F | DIASTOLIC BLOOD PRESSURE: 65 MMHG | BODY MASS INDEX: 35.67 KG/M2 | SYSTOLIC BLOOD PRESSURE: 132 MMHG | WEIGHT: 221 LBS | OXYGEN SATURATION: 96 % | HEART RATE: 80 BPM | RESPIRATION RATE: 18 BRPM
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
## 2025-04-17 DIAGNOSIS — J45.20 MILD INTERMITTENT ASTHMA WITHOUT COMPLICATION: ICD-10-CM

## 2025-04-17 DIAGNOSIS — H65.194 OTHER RECURRENT ACUTE NONSUPPURATIVE OTITIS MEDIA OF RIGHT EAR: ICD-10-CM

## 2025-04-17 DIAGNOSIS — M25.562 ACUTE PAIN OF BOTH KNEES: ICD-10-CM

## 2025-04-17 DIAGNOSIS — M25.561 ACUTE PAIN OF BOTH KNEES: ICD-10-CM

## 2025-04-17 DIAGNOSIS — E11.43 TYPE 2 DIABETES MELLITUS WITH DIABETIC AUTONOMIC NEUROPATHY, WITHOUT LONG-TERM CURRENT USE OF INSULIN: ICD-10-CM

## 2025-04-17 DIAGNOSIS — I10 ESSENTIAL HYPERTENSION: ICD-10-CM

## 2025-04-17 RX ORDER — CIPROFLOXACIN AND DEXAMETHASONE 3; 1 MG/ML; MG/ML
4 SUSPENSION/ DROPS AURICULAR (OTIC) 2 TIMES DAILY
Qty: 7.5 ML | Refills: 0 | Status: SHIPPED | OUTPATIENT
Start: 2025-04-17

## 2025-04-17 NOTE — PROGRESS NOTES
Laura Villarreal presents for an evaluation physician visit at the [facility] [place]    Chief Complaint:  knee pain s/p fall      Subjective      History of Present Illness    Laura has h/o of knee OA, had a gel injections in both knees, then had a fall, tripped over a rollator, and fell on both knees.  R knee became swollen, and L knee has lateral bruising.  Xray knees B neg for fracture, mild degenerative disease in the right knee    Labs: , Na 142, K 4.5, Cr 0.62, gfr 99, ,alt 17, ast 20, hcv nonreactive, hgba1c 6.7%    She also presents with recurrent right ear pain.  She had eardrops leftover from last year but had  and started using these and her ear pain has improved.      Elevated pulse is improved    She presents with type 2 diabetes, hgba1c 6.7%, she is doing well with healthy diabetic diet.  Recommend yearly eye exams.  No acute issues with her feet.    She presents with chronic asthma with allergies and remains stable on Arnuity and Spiriva, on allergy shots and cetirizine 10 mg daily.        She presents with chronic hypertension, BP is well controlled on carvedilol 25 mg twice daily and amlodipine 5 mg twice daily, she tolerates meds well, she avoid sodium in diet.  She is working on healthy diet and weight loss.     Other health issues are stable and unchanged:  She has sleep apnea, uses cpap a and her readings on her CPAP machine are normal.    Chronic GERD is stable on omeprazole 40 mg daily, she knows she is avoid spicy and acidic food in her diet     She presents with depression remains stable and well-controlled with Cymbalta 60 mg daily/Abilify 5 mg daily, she is stable, sleeping well, no SI/HI     She presents with hypercholesterolemia, current treatment is rosuvastatin 10 mg nightly.  She tolerates medication well.    Current meds: Amlodipine 5 mg twice daily, Abilify 2 mg daily, newly 200 mcg 1 puff nightly, calcium with magnesium and zinc and D3, carvedilol  12.5 mg twice daily, Zyrtec 10 mg daily, diclofenac 1% topical gel,  mg twice a week, Cymbalta 60 mg daily, Flonase nasal spray, folic acid 800 mcg daily, glucosamine 2 Gummies daily, Singulair 10 mg nightly, omeprazole 40 mg daily, probiotic Gummies, rosuvastatin 10 mg daily, Spiriva 1.2 mcg 2 puffs nightly, turmeric 50 mg twice daily, vitamin C 500 mg daily    Labs: 1/15/25: cholesterol 148, triglycerides 126, HDL 45, LDL 81, hemoglobin A1c 6.9%, TSH 1.88%              Objective   Vital Signs:   /65   Pulse 80   Temp 97.9 °F (36.6 °C)   Resp 18   Wt 100 kg (221 lb)   SpO2 96%   BMI 35.67 kg/m²     Physical Exam  Vitals and nursing note reviewed.   Constitutional:       General: She is not in acute distress.     Appearance: Normal appearance. She is not ill-appearing, toxic-appearing or diaphoretic.   HENT:      Head: Normocephalic and atraumatic.      Right Ear: Tympanic membrane, ear canal and external ear normal. Drainage and swelling present. No middle ear effusion. Tympanic membrane is not injected, retracted or bulging.      Nose: No congestion or rhinorrhea.      Mouth/Throat:      Mouth: Mucous membranes are moist.      Pharynx: Oropharynx is clear. No oropharyngeal exudate or posterior oropharyngeal erythema.   Eyes:      Extraocular Movements: Extraocular movements intact.      Conjunctiva/sclera: Conjunctivae normal.      Pupils: Pupils are equal, round, and reactive to light.   Cardiovascular:      Rate and Rhythm: Normal rate and regular rhythm.      Heart sounds: Normal heart sounds.   Pulmonary:      Effort: Pulmonary effort is normal.      Breath sounds: Normal breath sounds. No wheezing, rhonchi or rales.   Abdominal:      General: Abdomen is flat.      Palpations: Abdomen is soft. There is no mass.      Tenderness: There is no abdominal tenderness.      Hernia: No hernia is present.   Musculoskeletal:      Cervical back: Neck supple. No rigidity.      Right knee: Swelling  present. No LCL laxity or MCL laxity. Abnormal patellar mobility.      Left knee: Ecchymosis present. No LCL laxity or MCL laxity.Abnormal patellar mobility.      Right lower leg: No edema.      Left lower leg: No edema.   Lymphadenopathy:      Cervical: No cervical adenopathy.   Skin:     General: Skin is warm and dry.   Neurological:      General: No focal deficit present.      Mental Status: She is alert and oriented to person, place, and time. Mental status is at baseline.   Psychiatric:         Mood and Affect: Mood normal.         Behavior: Behavior normal.         Thought Content: Thought content normal.         Judgment: Judgment normal.                  Assessment and Plan   Assessment & Plan  Fall, initial encounter  Status post fall.  X-rays of the knees were negative for acute fracture with mild degenerative joint disease seen.  Recommend topical diclofenac gel, Tylenol as needed and follow-up with orthopedics.  She recently had gel injections       Acute pain of both knees  Neg xrays           Other recurrent acute nonsuppurative otitis media of right ear  Ok to restart cipro ear drops       Essential hypertension  BP stable and well controlled, no changes in current meds or tx plan         Type 2 diabetes mellitus with diabetic autonomic neuropathy, without long-term current use of insulin  Improving with healthy diet and wt loss, cont to exercise         Mild intermittent asthma without complication  No acute issues, cont current tx plan                    Follow Up   Return if symptoms worsen or fail to improve.    Review of Systems   Constitutional:  Negative for chills, fatigue and fever.   HENT:  Positive for ear pain. Negative for sinus pressure and sore throat.    Eyes:  Negative for blurred vision and double vision.   Respiratory:  Negative for cough, shortness of breath and wheezing.    Cardiovascular:  Negative for chest pain, palpitations and leg swelling.   Gastrointestinal:  Negative for  "abdominal pain, blood in stool, constipation, diarrhea, nausea and vomiting.   Musculoskeletal:  Positive for arthralgias.   Skin:  Negative for rash.   Neurological:  Negative for dizziness and headache.   Psychiatric/Behavioral:  Negative for sleep disturbance, suicidal ideas and depressed mood. The patient is not nervous/anxious.         Result Review   The following data was reviewed by Corinne Randolph MD on 03/20/2025.  No results found for: \"WBC\", \"HGB\", \"HCT\", \"MCV\", \"PLT\"        Part of this note may be an electronic transcription/translation of spoken language to printed   text using the Dragon Dictation System.      "

## 2025-05-14 ENCOUNTER — NURSING HOME (OUTPATIENT)
Dept: FAMILY MEDICINE CLINIC | Age: 65
End: 2025-05-14
Payer: MEDICARE

## 2025-05-14 VITALS
SYSTOLIC BLOOD PRESSURE: 138 MMHG | TEMPERATURE: 97.2 F | BODY MASS INDEX: 35.67 KG/M2 | DIASTOLIC BLOOD PRESSURE: 73 MMHG | HEART RATE: 90 BPM | RESPIRATION RATE: 18 BRPM | WEIGHT: 221 LBS | OXYGEN SATURATION: 97 %

## 2025-05-14 DIAGNOSIS — E11.43 TYPE 2 DIABETES MELLITUS WITH DIABETIC AUTONOMIC NEUROPATHY, WITHOUT LONG-TERM CURRENT USE OF INSULIN: ICD-10-CM

## 2025-05-14 DIAGNOSIS — E66.01 CLASS 2 SEVERE OBESITY DUE TO EXCESS CALORIES WITH SERIOUS COMORBIDITY AND BODY MASS INDEX (BMI) OF 35.0 TO 35.9 IN ADULT: ICD-10-CM

## 2025-05-14 DIAGNOSIS — E66.812 CLASS 2 SEVERE OBESITY DUE TO EXCESS CALORIES WITH SERIOUS COMORBIDITY AND BODY MASS INDEX (BMI) OF 35.0 TO 35.9 IN ADULT: ICD-10-CM

## 2025-05-14 DIAGNOSIS — I10 ESSENTIAL HYPERTENSION: ICD-10-CM

## 2025-05-14 DIAGNOSIS — R53.83 FATIGUE, UNSPECIFIED TYPE: Primary | ICD-10-CM

## 2025-05-14 NOTE — PROGRESS NOTES
[facility] Vill[facility]real [facility] [place]    Chief Complaint:  fatigue and lab review      Subjective      History of Present Illness    Laura c/o fatigue,  she has to nap in the afternoon often or she cannot make it through the evening.  She does note she has been pushing herself really hard with work.  Labs were checked and her TSH was normal at 1.99, vitamin D was normal at 59.4 and vitamin B-12 was normal at 623.  Her blood pressures are stable and well-controlled.  Her heart rate is 90 so bradycardia is not an issue.  She has not been running any fevers.  She does have underlying sleep apnea and is using her CPAP with frequent awakenings. Told by her sleep specialist told her her CPAP machine is not working properly and putting out what it should be.  She is getting ready to get a new CPAP but is unable to with Medicare until it has been 5 years which will be in July of this year.  And sleep denies CP/LE edema.  Mild SOA with stairs    S/P a fall with B knee pain, pain has resolved.   Xray knees B neg for fracture, mild degenerative disease in the right knee    She presents with type 2 diabetes, hgba1c 6.7%, she is doing well with healthy diabetic diet.  Recommend yearly eye exams.  No acute issues with her feet.  She is really doing well following her strict healthy diabetic diet and has not lost any weight.  She is ready to start the GLP-1 medication but defers injections    She presents with chronic asthma with allergies and is doing well on Arnuity and Spiriva, on allergy shots and cetirizine 10 mg daily.        She presents with chronic hypertension that continues to  be well controlled on carvedilol 25 mg twice daily and amlodipine 5 mg twice daily, she tolerates meds well, she avoid sodium in diet.  She is working on healthy diet and weight loss.     She presents with chronic GERD is stable on omeprazole 40 mg daily, she knows she is avoid spicy and  acidic food in her diet     She presents with depression and it remains stable and well-controlled with Cymbalta 60 mg daily/Abilify 5 mg daily, she is stable, sleeping well, no SI/HI     She presents with hypercholesterolemia, current treatment is rosuvastatin 10 mg nightly.  She tolerates medication well.    Current meds: Amlodipine 5 mg twice daily, Abilify 2 mg daily, newly 200 mcg 1 puff nightly, calcium with magnesium and zinc and D3, carvedilol 12.5 mg twice daily, Zyrtec 10 mg daily, diclofenac 1% topical gel,  mg twice a week, Cymbalta 60 mg daily, Flonase nasal spray, folic acid 800 mcg daily, glucosamine 2 Gummies daily, Singulair 10 mg nightly, omeprazole 40 mg daily, probiotic Gummies, rosuvastatin 10 mg daily, Spiriva 1.2 mcg 2 puffs nightly, turmeric 50 mg twice daily, vitamin C 500 mg daily    Labs: 1/15/25: cholesterol 148, triglycerides 126, HDL 45, LDL 81, hemoglobin A1c 6.9%, TSH 1.88%              Objective   Vital Signs:   /73   Pulse 90   Temp 97.2 °F (36.2 °C)   Resp 18   Wt 100 kg (221 lb)   SpO2 97%   BMI 35.67 kg/m²     Physical Exam  Vitals and nursing note reviewed.   Constitutional:       General: She is not in acute distress.     Appearance: Normal appearance. She is obese. She is not ill-appearing, toxic-appearing or diaphoretic.   HENT:      Right Ear: Drainage and swelling present. No middle ear effusion. Tympanic membrane is not injected, retracted or bulging.   Eyes:      Extraocular Movements: Extraocular movements intact.      Conjunctiva/sclera: Conjunctivae normal.      Pupils: Pupils are equal, round, and reactive to light.   Cardiovascular:      Rate and Rhythm: Normal rate and regular rhythm.      Heart sounds: Normal heart sounds.   Pulmonary:      Effort: Pulmonary effort is normal.      Breath sounds: Normal breath sounds. No wheezing, rhonchi or rales.   Musculoskeletal:      Cervical back: Neck supple. No rigidity.      Right knee: Swelling present.  No LCL laxity or MCL laxity. Abnormal patellar mobility.      Left knee: Ecchymosis present. No LCL laxity or MCL laxity.Abnormal patellar mobility.      Right lower leg: No edema.      Left lower leg: No edema.   Lymphadenopathy:      Cervical: No cervical adenopathy.   Skin:     General: Skin is warm and dry.   Neurological:      General: No focal deficit present.      Mental Status: She is alert and oriented to person, place, and time. Mental status is at baseline.   Psychiatric:         Mood and Affect: Mood normal.         Behavior: Behavior normal.         Thought Content: Thought content normal.         Judgment: Judgment normal.                  Assessment and Plan   Assessment & Plan  Fatigue, unspecified type  This is due to bad cpap machine, should improve when her machine is replaced       Essential hypertension  Blood pressures are stable and well-controlled.  No changes to current meds or treatment plan         Type 2 diabetes mellitus with diabetic autonomic neuropathy, without long-term current use of insulin  Well controlled on current tx plan, she wants to start GLP-1 but does not want to take shots.  She wants to do this for weight loss.  She is going to know if she can get this in a pill or patch form but that is not available at this time         Class 2 severe obesity due to excess calories with serious comorbidity and body mass index (BMI) of 35.0 to 35.9 in adult  Patient's (Body mass index is 35.67 kg/m².) indicates that they are obese (BMI >30) with health conditions that include hypertension, diabetes mellitus, and dyslipidemias . Weight is unchanged. BMI  is above average; BMI management plan is completed. We discussed portion control and increasing exercise.   she wants to start GLP-1 but does not want to take shots.  She wants to do this for weight loss.  She is going to know if she can get this in a pill or patch form but that is not available at this time            Follow Up   Return  in about 3 months (around 8/14/2025) for Recheck.    Review of Systems   Constitutional:  Positive for fatigue. Negative for chills and fever.   HENT:  Positive for ear pain. Negative for sinus pressure and sore throat.    Eyes:  Negative for blurred vision and double vision.   Respiratory:  Negative for cough, shortness of breath and wheezing.    Cardiovascular:  Negative for chest pain, palpitations and leg swelling.   Gastrointestinal:  Negative for abdominal pain, blood in stool, constipation, diarrhea, nausea and vomiting.   Musculoskeletal:  Positive for arthralgias.   Skin:  Negative for rash.   Neurological:  Negative for dizziness and headache.   Psychiatric/Behavioral:  Negative for sleep disturbance, suicidal ideas and depressed mood. The patient is not nervous/anxious.               Part of this note may be an electronic transcription/translation of spoken language to printed   text using the Dragon Dictation System.

## 2025-05-14 NOTE — ASSESSMENT & PLAN NOTE
Blood pressures are stable and well-controlled.  No changes to current meds or treatment plan

## 2025-06-29 DIAGNOSIS — K21.9 GASTRO-ESOPHAGEAL REFLUX DISEASE WITHOUT ESOPHAGITIS: ICD-10-CM

## 2025-06-30 RX ORDER — OMEPRAZOLE 40 MG/1
40 CAPSULE, DELAYED RELEASE ORAL DAILY
Qty: 90 CAPSULE | Refills: 1 | Status: SHIPPED | OUTPATIENT
Start: 2025-06-30

## 2025-07-07 RX ORDER — MONTELUKAST SODIUM 10 MG/1
10 TABLET ORAL
Qty: 30 TABLET | Refills: 6 | OUTPATIENT
Start: 2025-07-07

## 2025-07-23 ENCOUNTER — TELEPHONE (OUTPATIENT)
Dept: FAMILY MEDICINE CLINIC | Age: 65
End: 2025-07-23

## 2025-07-23 ENCOUNTER — NURSING HOME (OUTPATIENT)
Dept: FAMILY MEDICINE CLINIC | Age: 65
End: 2025-07-23
Payer: MEDICARE

## 2025-07-23 VITALS
BODY MASS INDEX: 34.84 KG/M2 | WEIGHT: 222 LBS | HEIGHT: 67 IN | TEMPERATURE: 98.3 F | RESPIRATION RATE: 18 BRPM | DIASTOLIC BLOOD PRESSURE: 70 MMHG | HEART RATE: 85 BPM | SYSTOLIC BLOOD PRESSURE: 145 MMHG | OXYGEN SATURATION: 95 %

## 2025-07-23 DIAGNOSIS — Z23 ENCOUNTER FOR IMMUNIZATION: ICD-10-CM

## 2025-07-23 DIAGNOSIS — K59.00 CONSTIPATION, UNSPECIFIED CONSTIPATION TYPE: ICD-10-CM

## 2025-07-23 DIAGNOSIS — Z78.0 MENOPAUSE: ICD-10-CM

## 2025-07-23 DIAGNOSIS — E66.812 CLASS 2 SEVERE OBESITY DUE TO EXCESS CALORIES WITH SERIOUS COMORBIDITY AND BODY MASS INDEX (BMI) OF 35.0 TO 35.9 IN ADULT: ICD-10-CM

## 2025-07-23 DIAGNOSIS — Z00.00 ENCOUNTER FOR ANNUAL WELLNESS EXAM IN MEDICARE PATIENT: Primary | ICD-10-CM

## 2025-07-23 DIAGNOSIS — E11.43 TYPE 2 DIABETES MELLITUS WITH DIABETIC AUTONOMIC NEUROPATHY, WITHOUT LONG-TERM CURRENT USE OF INSULIN: ICD-10-CM

## 2025-07-23 DIAGNOSIS — K21.9 GASTRO-ESOPHAGEAL REFLUX DISEASE WITHOUT ESOPHAGITIS: ICD-10-CM

## 2025-07-23 DIAGNOSIS — Z12.31 ENCOUNTER FOR SCREENING MAMMOGRAM FOR BREAST CANCER: ICD-10-CM

## 2025-07-23 DIAGNOSIS — J45.20 MILD INTERMITTENT ASTHMA WITHOUT COMPLICATION: ICD-10-CM

## 2025-07-23 DIAGNOSIS — Z12.11 COLON CANCER SCREENING: ICD-10-CM

## 2025-07-23 DIAGNOSIS — L20.84 INTRINSIC ECZEMA: ICD-10-CM

## 2025-07-23 DIAGNOSIS — I10 ESSENTIAL HYPERTENSION: ICD-10-CM

## 2025-07-23 DIAGNOSIS — E66.01 CLASS 2 SEVERE OBESITY DUE TO EXCESS CALORIES WITH SERIOUS COMORBIDITY AND BODY MASS INDEX (BMI) OF 35.0 TO 35.9 IN ADULT: ICD-10-CM

## 2025-07-23 RX ORDER — TRIAMCINOLONE ACETONIDE 1 MG/G
1 CREAM TOPICAL 2 TIMES DAILY
Qty: 15 G | Refills: 0 | Status: SHIPPED | OUTPATIENT
Start: 2025-07-23

## 2025-07-23 RX ORDER — SEMAGLUTIDE 1.34 MG/ML
0.25 INJECTION, SOLUTION SUBCUTANEOUS WEEKLY
Qty: 1.5 ML | Refills: 0 | Status: SHIPPED | OUTPATIENT
Start: 2025-07-23

## 2025-07-23 NOTE — ASSESSMENT & PLAN NOTE
No acute issues on her current treatment plan.  She is to continue using her inhalers as prescribed

## 2025-07-23 NOTE — PATIENT INSTRUCTIONS
"Exercises to do While Sitting    Exercises that you do while sitting (chair exercises) can give you many of the same benefits as full exercise. Benefits include strengthening your heart, burning calories, and keeping muscles and joints healthy. Exercise can also improve your mood and help with depression and anxiety.  You may benefit from chair exercises if you are unable to do standing exercises due to:  Diabetic foot pain.  Obesity.  Illness.  Arthritis.  Recovery from surgery or injury.  Breathing problems.  Balance problems.  Another type of disability.  Before starting chair exercises, check with your health care provider or a physical therapist to find out how much exercise you can tolerate and which exercises are safe for you. If your health care provider approves:  Start out slowly and build up over time. Aim to work up to about 10-20 minutes for each exercise session.  Make exercise part of your daily routine.  Drink water when you exercise. Do not wait until you are thirsty. Drink every 10-15 minutes.  Stop exercising right away if you have pain, nausea, shortness of breath, or dizziness.  If you are exercising in a wheelchair, make sure to lock the wheels.  Ask your health care provider whether you can do laith chi or yoga. Many positions in these mind-body exercises can be modified to do while seated.  Warm-up  Before starting other exercises:  Sit up as straight as you can. Have your knees bent at 90 degrees, which is the shape of the capital letter \"L.\" Keep your feet flat on the floor.  Sit at the front edge of your chair, if you can.  Pull in (tighten) the muscles in your abdomen and stretch your spine and neck as straight as you can. Hold this position for a few minutes.  Breathe in and out evenly. Try to concentrate on your breathing, and relax your mind.  Stretching  Exercise A: Arm stretch  Hold your arms out straight in front of your body.  Bend your hands at the wrist with your fingers pointing " "up, as if signaling someone to stop. Notice the slight tension in your forearms as you hold the position.  Keeping your arms out and your hands bent, rotate your hands outward as far as you can and hold this stretch. Aim to have your thumbs pointing up and your pinkie fingers pointing down.  Slowly repeat arm stretches for one minute as tolerated.  Exercise B: Leg stretch  If you can move your legs, try to \"draw\" letters on the floor with the toes of your foot. Write your name with one foot.  Write your name with the toes of your other foot.  Slowly repeat the movements for one minute as tolerated.  Exercise C: Reach for the aurelia  Reach your hands as far over your head as you can to stretch your spine.  Move your hands and arms as if you are climbing a rope.  Slowly repeat the movements for one minute as tolerated.  Range of motion exercises  Exercise A: Shoulder roll  Let your arms hang loosely at your sides.  Lift just your shoulders up toward your ears, then let them relax back down.  When your shoulders feel loose, rotate your shoulders in backward and forward circles.  Do shoulder rolls slowly for one minute as tolerated.  Exercise B: March in place  As if you are marching, pump your arms and lift your legs up and down. Lift your knees as high as you can.  If you are unable to lift your knees, just pump your arms and move your ankles and feet up and down.  March in place for one minute as tolerated.  Exercise C: Seated jumping jacks  Let your arms hang down straight.  Keeping your arms straight, lift them up over your head. Aim to point your fingers to the ceiling.  While you lift your arms, straighten your legs and slide your heels along the floor to your sides, as wide as you can.  As you bring your arms back down to your sides, slide your legs back together.  If you are unable to use your legs, just move your arms.  Slowly repeat seated jumping jacks for one minute as tolerated.  Strengthening " exercises  Exercise A: Shoulder squeeze  Hold your arms straight out from your body to your sides, with your elbows bent and your fists pointed at the ceiling.  Keeping your arms in the bent position, move them forward so your elbows and forearms meet in front of your face.  Open your arms back out as wide as you can with your elbows still bent, until you feel your shoulder blades squeezing together. Hold for 5 seconds.  Slowly repeat the movements forward and backward for one minute as tolerated.  Contact a health care provider if:  You have to stop exercising due to any of the following:  Pain.  Nausea.  Shortness of breath.  Dizziness.  Fatigue.  You have significant pain or soreness after exercising.  Get help right away if:  You have chest pain.  You have difficulty breathing.  These symptoms may represent a serious problem that is an emergency. Do not wait to see if the symptoms will go away. Get medical help right away. Call your local emergency services (911 in the [place] Do not drive yourself to the hospital.  Summary  Exercises that you do while sitting (chair exercises) can strengthen your heart, burn calories, and keep muscles and joints healthy.  You may benefit from chair exercises if you are unable to do standing exercises due to diabetic foot pain, obesity, recovery from surgery or injury, or other conditions.  Before starting chair exercises, check with your health care provider or a physical therapist to find out how much exercise you can tolerate and which exercises are safe for you.  This information is not intended to replace advice given to you by your health care provider. Make sure you discuss any questions you have with your health care provider.  Document Revised: 02/13/2022 Document Reviewed: 02/13/2022  Elsevier Patient Education © 2023 [employer] Kegel Exercises    Kegel exercises can help strengthen your pelvic floor muscles. The pelvic floor is a group of muscles that support your  rectum, small intestine, and bladder. In females, pelvic floor muscles also help support the uterus. These muscles help you control the flow of urine and stool (feces).  Kegel exercises are painless and simple. They do not require any equipment. Your provider may suggest Kegel exercises to:  Improve bladder and bowel control.  Improve sexual response.  Improve weak pelvic floor muscles after surgery to remove the uterus (hysterectomy) or after pregnancy, in females.  Improve weak pelvic floor muscles after prostate gland removal or surgery, in males.  Kegel exercises involve squeezing your pelvic floor muscles. These are the same muscles you squeeze when you try to stop the flow of urine or keep from passing gas. The exercises can be done while sitting, standing, or lying down, but it is best to vary your position.  Ask your health care provider which exercises are safe for you. Do exercises exactly as told by your health care provider and adjust them as directed. Do not begin these exercises until told by your health care provider.  Exercises  How to do Kegel exercises:  Squeeze your pelvic floor muscles tight. You should feel a tight lift in your rectal area. If you are a female, you should also feel a tightness in your vaginal area. Keep your stomach, buttocks, and legs relaxed.  Hold the muscles tight for up to 10 seconds.  Breathe normally.  Relax your muscles for up to 10 seconds.  Repeat as told by your health care provider.  Repeat this exercise daily as told by your health care provider. Continue to do this exercise for at least 4-6 weeks, or for as long as told by your health care provider.  You may be referred to a physical therapist who can help you learn more about how to do Kegel exercises.  Depending on your condition, your health care provider may recommend:  Varying how long you squeeze your muscles.  Doing several sets of exercises every day.  Doing exercises for several weeks.  Making Kegel  exercises a part of your regular exercise routine.  This information is not intended to replace advice given to you by your health care provider. Make sure you discuss any questions you have with your health care provider.  Document Revised: 04/28/2022 Document Reviewed: 04/28/2022  [employer]vier Patient Education © 2023 [employer]

## 2025-07-23 NOTE — PROGRESS NOTES
The ABCs of the Annual Wellness Visit at [facility] Medicare Wellness Visit    Subjective    Laura Villarreal is a 65 y.o. female who presents for a Medicare Wellness Visit.    The following portions of the patient's history were reviewed and   updated as appropriate: allergies, current medications, past family history, past medical history, past social history, past surgical history, and problem list.    Compared to one year ago, the patient feels her physical   health is worsedue to her diabetes dx    Compared to one year ago, the patient feels her mental   health is the same.    Recent Hospitalizations:  She was not admitted to the hospital during the last year.       Advance Care Planning  Advance Directive is not on file.  ACP discussion was held with the patient during this visit. Patient has an advance directive (not in EMR), copy requested.    Does the patient have evidence of cognitive impairment? No    Aspirin is on active medication list. Aspirin use is not indicated based on review of current medical condition/s. Risk of harm outweighs potential benefits. Patient instructed to discontinue this medication.  .      Patient Active Problem List   Diagnosis    Anxiety    Asthma    Degeneration of intervertebral disc    EKG abnormalities    Hoarseness    Low back pain    Sinus drainage    Vitamin C deficiency    Non-seasonal allergic rhinitis due to pollen    Essential hypertension    Borderline diabetes    Class 2 severe obesity due to excess calories with serious comorbidity and body mass index (BMI) of 37.0 to 37.9 in adult    Major depressive disorder with single episode, in full remission       Current Medical Providers:  Patient Care Team:  Corinne Randolph MD as PCP - General (Family Medicine)    No opioid medication identified on active medication list. I have reviewed chart for other potential  high risk medication/s and harmful drug interactions in the elderly.            "  Objective    Vitals:    25 0925   BP: 145/70   Pulse: 85   Resp: 18   Temp: 98.3 °F (36.8 °C)   SpO2: 95%   Weight: 101 kg (222 lb)   Height: 168.9 cm (66.5\")     Estimated body mass index is 35.29 kg/m² as calculated from the following:    Height as of this encounter: 168.9 cm (66.5\").    Weight as of this encounter: 101 kg (222 lb).    Class 2 Severe Obesity (BMI >=35 and <=39.9). Obesity-related health conditions include the following: hypertension, diabetes mellitus, and dyslipidemias. Obesity is worsening. BMI is is above average; BMI management plan is completed. We discussed portion control and increasing exercise.      Gait and Balance Evaluation: Normal          HEALTH RISK ASSESSMENT    Smoking Status:  Social History     Tobacco Use   Smoking Status Never   Smokeless Tobacco Never     Alcohol Consumption:  Social History     Substance and Sexual Activity   Alcohol Use Never     Fall Risk Screen:    STEADI Fall Risk Assessment was completed, and patient is at LOW risk for falls.Assessment completed on:2025    Depression Screenin/23/2025     9:00 AM   PHQ-2/PHQ-9 Depression Screening   Little interest or pleasure in doing things Not at all   Feeling down, depressed, or hopeless Several days   How difficult have these problems made it for you to do your work, take care of things at home, or get along with other people? Not difficult at all       Health Habits and Functional and Cognitive Screenin/23/2025     9:00 AM   Functional & Cognitive Status   Do you have difficulty preparing food and eating? No   Do you have difficulty bathing yourself, getting dressed or grooming yourself? No   Do you have difficulty using the toilet? No   Do you have difficulty moving around from place to place? No   Do you have trouble with steps or getting out of a bed or a chair? No   Current Diet Well Balanced Diet   Dental Exam Up to date   Eye Exam Up to date   Exercise (times per week) 7 times " per week   Current Exercises Include Walking   Do you need help using the phone?  No   Are you deaf or do you have serious difficulty hearing?  No   Do you need help to go to places out of walking distance? No   Do you need help shopping? No   Do you need help preparing meals?  No   Do you need help with housework?  No   Do you need help with laundry? No   Do you need help taking your medications? No   Do you need help managing money? No   Do you ever drive or ride in a car without wearing a seat belt? No   Have you felt unusual fatigue (could be tiredness), stress, anger or loneliness in the last month? No   Who do you live with? Community   If you need help, do you have trouble finding someone available to you? No   Have you been bothered in the last four weeks by sexual problems? No   Do you have difficulty concentrating, remembering or making decisions? No       Visual Acuity:             Age-appropriate Screening Schedule:  Refer to the list below for future screening recommendations based on patient's age, sex and/or medical conditions. Orders for these recommended tests are listed in the plan section. The patient has been provided with a written plan.    Health Maintenance   Topic Date Due    URINE MICROALBUMIN-CREATININE RATIO (uACR)  Never done    DIABETIC EYE EXAM  09/18/2025    HEMOGLOBIN A1C  10/16/2025    COVID-19 Vaccine (8 - 2024-25 season) 12/25/2025 (Originally 3/29/2025)    INFLUENZA VACCINE  10/01/2025    DXA SCAN  10/24/2025    MAMMOGRAM  07/22/2026    ANNUAL WELLNESS VISIT  07/23/2026    DIABETIC FOOT EXAM  07/23/2026    COLORECTAL CANCER SCREENING  08/14/2034    Pneumococcal Vaccine 50+  Completed    ZOSTER VACCINE  Completed    HEPATITIS C SCREENING  Discontinued                    CMS Preventative Services Quick Reference  Risk Factors Identified During Encounter  Depression/Dysphoria: Current medication is effective, no change recommended  Immunizations Discussed/Encouraged: Influenza and  COVID19  Inactivity/Sedentary: Patient was advised to exercise at least 150 minutes a week per CDC recommendations.  Urinary Incontinence: Kegel exercises discussed  Dental Screening Recommended  Vision Screening Recommended  The above risks/problems have been discussed with the patient.  Pertinent information has been shared with the patient in the After Visit Summary.  An After Visit Summary and PPPS were made available to the patient.    Follow Up:   Next Medicare Wellness visit to be scheduled in 1 year.       Additional E&M Note during same encounter follows:  Patient has multiple medical problems which are significant and separately identifiable that require additional work above and beyond the Medicare Wellness Visit.         Laura Villarreal presents to [facility].    Chief Complaint:  diabetes follow up        Subjective   History of Present Illness:  HPI    She presents with type 2 diabetes, hgba1c 7.4%, she is trying hard to follow a healthy diabetic diet, she cannot eat a lot of vegetables because she has allergies to several vegetables including green beans and corn.  She continues to get her yearly eye exams.  No acute issues with her feet other than foot deformity, denies paresthesias.  She does have calluses.  She has seen the dietitian and continues to gain weight.  She is ready to start the GLP-1 medication but defers injections    Laura has sleep apnea and is seeing the sleep specialist and working on getting her CPAP to work correctly    Dexa: normal lumbar spine, osteopenia in Left hip    She presents with chronic asthma with allergies and is doing well on Arnuity and Spiriva, on allergy shots and cetirizine 10 mg daily.        She presents with chronic hypertension, stable and well controlled on carvedilol 25 mg twice daily and amlodipine 5 mg twice daily, she tolerates meds well, she avoid sodium in diet.  She is working on healthy diet and weight loss.     She  "presents with chronic GERD and reports that her heartburn is stable and well-controlled on omeprazole 40 mg daily, she knows she is avoid spicy and acidic food in her diet     She presents with chronic depression and it remains stable and well-controlled with Cymbalta 60 mg daily/Abilify 5 mg daily, she is stable, sleeping well, no SI/HI.  She does note she has been more stressed with recent move but can feel overwhelming.     She presents with chronic hypercholesterolemia, current treatment is rosuvastatin 10 mg nightly.  She tolerates medication well.    Current meds: Amlodipine 5 mg twice daily, Abilify 2 mg daily, Anuity 200 mcg 1 puff nightly, calcium with magnesium and zinc and D3, carvedilol 12.5 mg twice daily, Zyrtec 10 mg daily, diclofenac 1% topical gel,  mg twice a week, Cymbalta 60 mg daily, Flonase nasal spray, folic acid 800 mcg daily, glucosamine 2 Gummies daily, Singulair 10 mg nightly, omeprazole 40 mg daily, probiotic Gummies, rosuvastatin 10 mg daily, Spiriva 1.2 mcg 2 puffs nightly, turmeric 50 mg twice daily, vitamin C 500 mg daily    Labs: 1/15/25: cholesterol 148, triglycerides 126, HDL 45, LDL 81, hemoglobin A1c 6.9%, TSH 1.88%                Result Review   The following data was reviewed by Corinne Randolph MD on 07/23/2025.  No results found for: \"WBC\", \"HGB\", \"HCT\", \"MCV\", \"PLT\"  No results found for: \"GLUCOSE\", \"BUN\", \"CREATININE\", \"NA\", \"K\", \"CL\", \"CALCIUM\", \"PROTEINTOT\", \"ALBUMIN\", \"ALT\", \"AST\", \"ALKPHOS\", \"BILITOT\", \"GLOB\", \"AGRATIO\", \"BCR\", \"ANIONGAP\", \"EGFR\"  No results found for: \"CHOL\", \"CHLPL\", \"TRIG\", \"HDL\", \"LDL\", \"LDLDIRECT\"  No results found for: \"TSH\"  No results found for: \"HGBA1C\"  No results found for: \"PSA\"  No results found for: \"IRON\"   No results found for: \"[id number]\"          Assessment and Plan:   Assessment & Plan  Encounter for annual wellness exam in Medicare patient         Encounter for screening mammogram for breast cancer  Screening breast exam " MANAN.  She has a screening mammogram scheduled for tomorrow       Encounter for immunization  Recommend COVID and flu vaccine in the fall       Menopause  Repeat DEXA ordered  Orders:    DEXA Bone Density Axial; Future    Colon cancer screening  Colon cancer screening is up-to-date       Type 2 diabetes mellitus with diabetic autonomic neuropathy, without long-term current use of insulin  Will initiate a GLP-1 Ozempic 0.25 mg weekly to see if we can help with her elevated blood sugars and with weight loss.  She is to continue yearly eye exams.  Foot exam showed foot deformity and I recommend diabetic shoes and she defers today         Class 2 severe obesity due to excess calories with serious comorbidity and body mass index (BMI) of 35.0 to 35.9 in adult  Patient's (Body mass index is 35.29 kg/m².) indicates that they are obese (BMI >30) with health conditions that include hypertension, diabetes mellitus, and dyslipidemias . Weight is worsening. BMI  is above average; BMI management plan is completed. We discussed portion control and increasing exercise.  She is seeing the dietitian to help her with healthy diabetic diet and I will initiate a GLP-1 Ozempic    Patient wants to start a GLP1 medication, they understand their insurance may not cover this, and they are made aware today of the risks of these meds including rebound wt gain, increased suicidal ideation, N/V and severe constipation/bowel obstruction       Essential hypertension  Blood pressure is well controlled on current medication and treatment plan, tolerates meds well, no changes are needed to current meds or tx plan, will check appropriate labs today.  Encourage healthy diet and daily exercise         Gastro-esophageal reflux disease without esophagitis  GERD is stable and well-controlled on current treatment plan.  Recommend she avoid spicy and acidic food in her diet       Constipation, unspecified constipation type  Constipation stable with stool  softeners and MiraLAX       Mild intermittent asthma without complication  No acute issues on her current treatment plan.  She is to continue using her inhalers as prescribed               Intrinsic eczema  Betamethasone did not help her rash.  Will change her over to triamcinolone to see if this works better  Orders:    triamcinolone (KENALOG) 0.1 % cream; Apply 1 Application topically to the appropriate area as directed 2 (Two) Times a Day. Stop using medication if any skin irritation, h/o allergy to prednisone                     Medications:      Current Outpatient Medications:     albuterol (PROVENTIL) (2.5 MG/3ML) 0.083% nebulizer solution, , Disp: , Rfl:     albuterol sulfate  (90 Base) MCG/ACT inhaler, Inhale 2 puffs Every 4 (Four) Hours As Needed., Disp: , Rfl:     amLODIPine (NORVASC) 5 MG tablet, Take 1 tablet by mouth Daily., Disp: 90 tablet, Rfl: 1    ARIPiprazole (Abilify) 2 MG tablet, Take 1 tablet by mouth Daily., Disp: 90 tablet, Rfl: 1    Arnuity Ellipta 200 MCG/ACT, Inhale 1 Act every night at bedtime., Disp: 30 each, Rfl: 5    ascorbic acid (VITAMIN C) 500 MG tablet, Vitamin C 500 mg oral tablet take 1 tablet by oral route daily   Active, Disp: , Rfl:     aspirin (aspirin) 81 MG EC tablet, aspirin 81 mg oral tablet,delayed release (DR/EC) take 1 tablet (81 mg) by oral route once daily   Active, Disp: , Rfl:     carvedilol (Coreg) 25 MG tablet, Take 1 tablet by mouth 2 (Two) Times a Day With Meals., Disp: 60 tablet, Rfl: 5    cetirizine (zyrTEC) 10 MG tablet, Take 1 tablet by mouth Daily., Disp: 90 tablet, Rfl: 1    Cholecalciferol 50 MCG (2000 UT) capsule, , Disp: , Rfl:     ciprofloxacin-dexAMETHasone (Ciprodex) 0.3-0.1 % otic suspension, Administer 4 drops to the right ear 2 (Two) Times a Day., Disp: 7.5 mL, Rfl: 0    clotrimazole (LOTRIMIN) 1 % external solution, Apply  topically to the appropriate area as directed., Disp: , Rfl:     Diclofenac Sodium (VOLTAREN) 1 % gel gel, Apply   topically to the appropriate area as directed 4 (Four) Times a Day., Disp: , Rfl:     diphenhydrAMINE (Benadryl Allergy) 25 mg capsule, Benadryl 25 mg oral capsule take 2 capsules (50 mg) by oral route once daily at bedtime as needed   Active, Disp: , Rfl:     docusate sodium (COLACE) 50 MG capsule, Take  by mouth 2 (Two) Times a Day As Needed for Constipation., Disp: , Rfl:     DULoxetine (CYMBALTA) 60 MG capsule, Take 1 capsule by mouth Daily., Disp: 90 capsule, Rfl: 1    EPINEPHrine (EpiPen 2-Edwin) 0.3 MG/0.3ML solution auto-injector injection, , Disp: , Rfl:     fluticasone (FLONASE) 50 MCG/ACT nasal spray, 2 sprays into the nostril(s) as directed by provider Daily., Disp: 15.8 mL, Rfl: 2    folic acid (FOLVITE) 800 MCG tablet, folic acid 800 mcg oral tablet take 1 tablet (0.8 mg) by oral route once daily   Active, Disp: , Rfl:     glucosamine-chondroitin 500-400 MG per tablet, Take 1 tablet by mouth., Disp: , Rfl:     guaiFENesin (Mucinex) 600 MG 12 hr tablet, Mucinex 600 mg oral tablet extended release 12hr take 2 tablets (1,200 mg) by oral route every 12 hours as needed   Active, Disp: , Rfl:     ibuprofen (ADVIL,MOTRIN) 800 MG tablet, Take 1 tablet by mouth 3 (Three) Times a Day., Disp: , Rfl:     lamoTRIgine (LaMICtal) 25 MG tablet, lamotrigine 25 mg oral tablet take 1 tablet by oral route 2 times a day   Active, Disp: , Rfl:     montelukast (SINGULAIR) 10 MG tablet, montelukast 10 mg oral tablet take 1 tablet (10 mg) by oral route once daily in the evening   Active, Disp: , Rfl:     omeprazole (priLOSEC) 40 MG capsule, TAKE 1 CAPSULE BY MOUTH ONCE DAILY, Disp: 90 capsule, Rfl: 1    promethazine-dextromethorphan (PROMETHAZINE-DM) 6.25-15 MG/5ML syrup, Take 5 mL by mouth 4 (Four) Times a Day As Needed for Cough., Disp: 180 mL, Rfl: 0    rosuvastatin (CRESTOR) 10 MG tablet, Take 1 tablet by mouth Daily., Disp: 90 tablet, Rfl: 1    Semaglutide,0.25 or 0.5MG/DOS, (Ozempic, 0.25 or 0.5 MG/DOSE,) 2 MG/1.5ML  "solution pen-injector, Inject 0.25 mg under the skin into the appropriate area as directed 1 (One) Time Per Week., Disp: 1.5 mL, Rfl: 0    Spiriva Respimat 1.25 MCG/ACT aerosol solution inhaler, , Disp: , Rfl:     triamcinolone (KENALOG) 0.1 % cream, Apply 1 Application topically to the appropriate area as directed 2 (Two) Times a Day. Stop using medication if any skin irritation, h/o allergy to prednisone, Disp: 15 g, Rfl: 0    Umeclidinium Bromide (Incruse Ellipta) 62.5 MCG/INH aerosol powder , Incruse Ellipta 62.5 mcg/actuation inhalation blister with device inhale 1 puff (62.5 mcg) by inhalation route once daily at the same time each day   Active, Disp: , Rfl:        Objective   Vital Signs:   /70   Pulse 85   Temp 98.3 °F (36.8 °C)   Resp 18   Ht 168.9 cm (66.5\")   Wt 101 kg (222 lb)   SpO2 95%   BMI 35.29 kg/m²       Physical Exam:  Physical Exam  Vitals and nursing note reviewed.   Constitutional:       General: She is not in acute distress.     Appearance: Normal appearance. She is not ill-appearing, toxic-appearing or diaphoretic.   HENT:      Head: Normocephalic and atraumatic.      Right Ear: Tympanic membrane, ear canal and external ear normal.      Left Ear: Tympanic membrane, ear canal and external ear normal.      Nose: No congestion or rhinorrhea.      Mouth/Throat:      Mouth: Mucous membranes are moist.      Pharynx: Oropharynx is clear. No oropharyngeal exudate or posterior oropharyngeal erythema.   Eyes:      Extraocular Movements: Extraocular movements intact.      Conjunctiva/sclera: Conjunctivae normal.      Pupils: Pupils are equal, round, and reactive to light.   Cardiovascular:      Rate and Rhythm: Normal rate and regular rhythm.      Pulses:           Dorsalis pedis pulses are 2+ on the right side and 2+ on the left side.      Heart sounds: Normal heart sounds.   Pulmonary:      Effort: Pulmonary effort is normal.      Breath sounds: Normal breath sounds. No wheezing, rhonchi " or rales.   Chest:   Breasts:     Right: Normal.      Left: Normal.   Abdominal:      General: Abdomen is flat.      Palpations: Abdomen is soft. There is no mass.      Tenderness: There is no abdominal tenderness.      Hernia: No hernia is present.   Musculoskeletal:      Cervical back: Neck supple. No rigidity.      Right lower leg: No edema.      Left lower leg: No edema.      Right foot: Deformity and bunion present.      Left foot: Deformity and bunion present.   Feet:      Right foot:      Protective Sensation: 7 sites tested.  7 sites sensed.      Skin integrity: Callus present. No ulcer or blister.      Toenail Condition: Right toenails are normal.      Left foot:      Protective Sensation: 7 sites tested.  7 sites sensed.      Skin integrity: Callus present. No ulcer or blister.      Toenail Condition: Left toenails are normal.      Comments: Diabetic Foot Exam Performed and Monofilament Test Performed    Bunion and hammer toes B     Lymphadenopathy:      Cervical: No cervical adenopathy.      Upper Body:      Right upper body: No axillary adenopathy.      Left upper body: No axillary adenopathy.   Skin:     General: Skin is warm and dry.      Comments: Mild patch exzema on ears B  8mm x 4mm patcg   Neurological:      General: No focal deficit present.      Mental Status: She is alert and oriented to person, place, and time. Mental status is at baseline.   Psychiatric:         Mood and Affect: Mood normal.         Behavior: Behavior normal.         Thought Content: Thought content normal.         Judgment: Judgment normal.                     Review of Systems:  Review of Systems   Constitutional:  Negative for chills and fever.   HENT:  Negative for ear pain, sinus pressure and sore throat.    Eyes:  Negative for blurred vision and double vision.   Respiratory:  Negative for cough, shortness of breath and wheezing.    Cardiovascular:  Negative for chest pain, palpitations and leg swelling.    Gastrointestinal:  Positive for constipation. Negative for abdominal pain, blood in stool, diarrhea, nausea and vomiting.   Skin:  Positive for rash.   Neurological:  Negative for dizziness and headache.   Psychiatric/Behavioral:  Positive for depressed mood. Negative for sleep disturbance and suicidal ideas. The patient is not nervous/anxious.             Follow Up   Return in about 3 months (around 10/23/2025) for Recheck.    Part of this note may be an electronic transcription/translation of spoken language to printed   text using the Dragon Dictation System.            Medical History:  Past Medical History:    Anxiety    Asthma    EKG abnormalities    H/O degenerative disc disease    Hoarseness    Lumbago    Sinus drainage    Vitamin C deficiency     Past Surgical History:    ADENOIDECTOMY    HYSTERECTOMY    KNEE SURGERY    SINUS SURGERY    TONSILLECTOMY      Family History   Problem Relation Age of Onset    Heart disease Father     Other Father         SPINE PROBLEMS    Diabetes Father     Cancer Maternal Grandfather     Canavan disease Paternal Grandmother     Heart disease Other         UNCLE     Social History     Tobacco Use    Smoking status: Never    Smokeless tobacco: Never   Substance Use Topics    Alcohol use: Never       Health Maintenance Due   Topic Date Due    URINE MICROALBUMIN-CREATININE RATIO (uACR)  Never done    DIABETIC EYE EXAM  09/18/2025    HEMOGLOBIN A1C  10/16/2025        Immunization History   Administered Date(s) Administered    Arexvy (RSV, Adults 60+ yrs) 01/03/2024    COVID-19 (PFIZER) 12YRS+ (COMIRNATY) 10/25/2023, 09/29/2024    COVID-19 (PFIZER) BIVALENT 12+YRS 10/12/2022    COVID-19 (PFIZER) Purple Cap Monovalent 01/22/2021, 02/12/2021, 10/31/2021, 04/26/2022    FLUAD TRI 65YR+ 09/29/2024    Flublok 18+yrs 09/19/2018    Fluzone (or Fluarix & Flulaval for VFC) >6mos 09/20/2017, 10/05/2021, 10/12/2022, 09/20/2023    Hep A, 2 Dose 06/20/2018, 12/20/2018    Hepatitis A 06/11/2018,  12/11/2018    Hepatitis B Adult/Adolescent IM 12/31/1993    Influenza Injectable Mdck Pf Quad 09/11/2019    Pneumococcal Conjugate 13-Valent (PCV13) 11/01/2016    Pneumococcal Conjugate 20-Valent (PCV20) 09/27/2023    Pneumococcal Polysaccharide (PPSV23) 01/01/2018    Shingrix 09/11/2019, 10/09/2020    influenza Split 10/14/2020       Allergies   Allergen Reactions    Fish-Derived Products Anaphylaxis     PT REPORTS SHE IS HIGHLY ALLERGIC TO ALL SHELLFISH, SEAFOOD INCLUDING TUNA, AND SHRIMP     Nuts Anaphylaxis     PT CARRIES AN EPI PEN FOR BOTH SEAFOOD AND NUT ALLERGIES     Niacin Hives    Penicillins Hives    Sulfa Antibiotics Hives    Tetanus Toxoids Hives    Keflex [Cephalexin] Unknown - Low Severity    Latex Hives    Prednisone Hives and Rash

## 2025-07-31 ENCOUNTER — PRIOR AUTHORIZATION (OUTPATIENT)
Dept: FAMILY MEDICINE CLINIC | Age: 65
End: 2025-07-31
Payer: MEDICARE

## 2025-08-10 DIAGNOSIS — J30.9 ALLERGIC RHINITIS, UNSPECIFIED: ICD-10-CM

## 2025-08-11 ENCOUNTER — OFFICE VISIT (OUTPATIENT)
Dept: FAMILY MEDICINE CLINIC | Age: 65
End: 2025-08-11
Payer: MEDICARE

## 2025-08-11 VITALS
DIASTOLIC BLOOD PRESSURE: 75 MMHG | OXYGEN SATURATION: 93 % | HEART RATE: 95 BPM | SYSTOLIC BLOOD PRESSURE: 111 MMHG | WEIGHT: 220 LBS | BODY MASS INDEX: 34.53 KG/M2 | HEIGHT: 67 IN | TEMPERATURE: 98.3 F

## 2025-08-11 DIAGNOSIS — E11.43 TYPE 2 DIABETES MELLITUS WITH DIABETIC AUTONOMIC NEUROPATHY, WITHOUT LONG-TERM CURRENT USE OF INSULIN: ICD-10-CM

## 2025-08-11 DIAGNOSIS — R31.29 OTHER MICROSCOPIC HEMATURIA: Primary | ICD-10-CM

## 2025-08-11 DIAGNOSIS — I10 ESSENTIAL HYPERTENSION: ICD-10-CM

## 2025-08-11 DIAGNOSIS — N95.2 VAGINAL ATROPHY: ICD-10-CM

## 2025-08-11 LAB
BACTERIA UR QL AUTO: ABNORMAL /HPF
BILIRUB UR QL STRIP: NEGATIVE
CLARITY UR: ABNORMAL
COLOR UR: YELLOW
GLUCOSE UR STRIP-MCNC: NEGATIVE MG/DL
HGB UR QL STRIP.AUTO: NEGATIVE
KETONES UR QL STRIP: NEGATIVE
LEUKOCYTE ESTERASE UR QL STRIP.AUTO: ABNORMAL
NITRITE UR QL STRIP: NEGATIVE
PH UR STRIP.AUTO: 5.5 [PH] (ref 5–8)
PROT UR QL STRIP: NEGATIVE
RBC # UR STRIP: ABNORMAL /HPF
REF LAB TEST METHOD: ABNORMAL
SP GR UR STRIP: 1.01 (ref 1–1.03)
SQUAMOUS #/AREA URNS HPF: ABNORMAL /HPF
UROBILINOGEN UR QL STRIP: ABNORMAL
WBC # UR STRIP: ABNORMAL /HPF

## 2025-08-11 PROCEDURE — 87086 URINE CULTURE/COLONY COUNT: CPT | Performed by: FAMILY MEDICINE

## 2025-08-11 PROCEDURE — 3074F SYST BP LT 130 MM HG: CPT | Performed by: FAMILY MEDICINE

## 2025-08-11 PROCEDURE — 99214 OFFICE O/P EST MOD 30 MIN: CPT | Performed by: FAMILY MEDICINE

## 2025-08-11 PROCEDURE — 3078F DIAST BP <80 MM HG: CPT | Performed by: FAMILY MEDICINE

## 2025-08-11 PROCEDURE — 81001 URINALYSIS AUTO W/SCOPE: CPT | Performed by: FAMILY MEDICINE

## 2025-08-11 RX ORDER — CETIRIZINE HYDROCHLORIDE 10 MG/1
10 TABLET ORAL
Qty: 30 TABLET | Refills: 6 | Status: SHIPPED | OUTPATIENT
Start: 2025-08-11

## 2025-08-11 RX ORDER — NITROFURANTOIN 25; 75 MG/1; MG/1
CAPSULE ORAL
COMMUNITY
Start: 2025-07-31 | End: 2025-08-11

## 2025-08-11 RX ORDER — ESTRADIOL 0.1 MG/G
CREAM VAGINAL
Qty: 42.5 G | Refills: 1 | Status: SHIPPED | OUTPATIENT
Start: 2025-08-11

## 2025-08-12 LAB — BACTERIA SPEC AEROBE CULT: NORMAL
